# Patient Record
Sex: MALE | Race: WHITE | NOT HISPANIC OR LATINO | Employment: UNEMPLOYED | URBAN - METROPOLITAN AREA
[De-identification: names, ages, dates, MRNs, and addresses within clinical notes are randomized per-mention and may not be internally consistent; named-entity substitution may affect disease eponyms.]

---

## 2017-06-28 ENCOUNTER — HOSPITAL ENCOUNTER (EMERGENCY)
Facility: HOSPITAL | Age: 27
Discharge: HOME | End: 2017-06-29
Attending: EMERGENCY MEDICINE | Admitting: EMERGENCY MEDICINE

## 2017-06-28 ENCOUNTER — APPOINTMENT (EMERGENCY)
Dept: RADIOLOGY | Facility: HOSPITAL | Age: 27
End: 2017-06-28

## 2017-06-28 VITALS
SYSTOLIC BLOOD PRESSURE: 148 MMHG | WEIGHT: 185 LBS | HEIGHT: 68 IN | BODY MASS INDEX: 28.04 KG/M2 | OXYGEN SATURATION: 99 % | TEMPERATURE: 98.5 F | DIASTOLIC BLOOD PRESSURE: 84 MMHG | RESPIRATION RATE: 20 BRPM | HEART RATE: 95 BPM

## 2017-06-28 DIAGNOSIS — R07.89 CHEST WALL PAIN: Primary | ICD-10-CM

## 2017-06-28 LAB
ALBUMIN SERPL BCP-MCNC: 4.2 G/DL (ref 3.5–5)
ALP SERPL-CCNC: 64 U/L (ref 46–116)
ALT SERPL W P-5'-P-CCNC: 21 U/L (ref 12–78)
ANION GAP SERPL CALCULATED.3IONS-SCNC: 9 MMOL/L (ref 4–13)
AST SERPL W P-5'-P-CCNC: 8 U/L (ref 5–45)
BASOPHILS # BLD AUTO: 0.05 THOUSANDS/ΜL (ref 0–0.1)
BASOPHILS NFR BLD AUTO: 1 % (ref 0–1)
BILIRUB DIRECT SERPL-MCNC: 0.09 MG/DL (ref 0–0.2)
BILIRUB SERPL-MCNC: 0.3 MG/DL (ref 0.2–1)
BUN SERPL-MCNC: 16 MG/DL (ref 5–25)
CALCIUM SERPL-MCNC: 9.4 MG/DL (ref 8.3–10.1)
CHLORIDE SERPL-SCNC: 105 MMOL/L (ref 100–108)
CO2 SERPL-SCNC: 31 MMOL/L (ref 21–32)
CREAT SERPL-MCNC: 1.19 MG/DL (ref 0.6–1.3)
EOSINOPHIL # BLD AUTO: 0.36 THOUSAND/ΜL (ref 0–0.61)
EOSINOPHIL NFR BLD AUTO: 5 % (ref 0–6)
ERYTHROCYTE [DISTWIDTH] IN BLOOD BY AUTOMATED COUNT: 12.4 % (ref 11.6–15.1)
GFR SERPL CREATININE-BSD FRML MDRD: >60 ML/MIN/1.73SQ M
GLUCOSE SERPL-MCNC: 106 MG/DL (ref 65–140)
HCT VFR BLD AUTO: 47.4 % (ref 36.5–49.3)
HGB BLD-MCNC: 16.5 G/DL (ref 12–17)
LIPASE SERPL-CCNC: 83 U/L (ref 73–393)
LYMPHOCYTES # BLD AUTO: 2.73 THOUSANDS/ΜL (ref 0.6–4.47)
LYMPHOCYTES NFR BLD AUTO: 34 % (ref 14–44)
MCH RBC QN AUTO: 29.9 PG (ref 26.8–34.3)
MCHC RBC AUTO-ENTMCNC: 34.8 G/DL (ref 31.4–37.4)
MCV RBC AUTO: 86 FL (ref 82–98)
MONOCYTES # BLD AUTO: 0.65 THOUSAND/ΜL (ref 0.17–1.22)
MONOCYTES NFR BLD AUTO: 8 % (ref 4–12)
NEUTROPHILS # BLD AUTO: 4.27 THOUSANDS/ΜL (ref 1.85–7.62)
NEUTS SEG NFR BLD AUTO: 53 % (ref 43–75)
NRBC BLD AUTO-RTO: 0 /100 WBCS
PLATELET # BLD AUTO: 199 THOUSANDS/UL (ref 149–390)
PMV BLD AUTO: 11 FL (ref 8.9–12.7)
POTASSIUM SERPL-SCNC: 4.5 MMOL/L (ref 3.5–5.3)
PROT SERPL-MCNC: 7.5 G/DL (ref 6.4–8.2)
RBC # BLD AUTO: 5.51 MILLION/UL (ref 3.88–5.62)
SODIUM SERPL-SCNC: 145 MMOL/L (ref 136–145)
WBC # BLD AUTO: 8.08 THOUSAND/UL (ref 4.31–10.16)

## 2017-06-28 PROCEDURE — 96374 THER/PROPH/DIAG INJ IV PUSH: CPT

## 2017-06-28 PROCEDURE — 96361 HYDRATE IV INFUSION ADD-ON: CPT

## 2017-06-28 PROCEDURE — 71020 HB CHEST X-RAY 2VW FRONTAL&LATL: CPT

## 2017-06-28 PROCEDURE — 80076 HEPATIC FUNCTION PANEL: CPT | Performed by: EMERGENCY MEDICINE

## 2017-06-28 PROCEDURE — 83690 ASSAY OF LIPASE: CPT | Performed by: EMERGENCY MEDICINE

## 2017-06-28 PROCEDURE — 80048 BASIC METABOLIC PNL TOTAL CA: CPT | Performed by: EMERGENCY MEDICINE

## 2017-06-28 PROCEDURE — 93005 ELECTROCARDIOGRAM TRACING: CPT | Performed by: EMERGENCY MEDICINE

## 2017-06-28 PROCEDURE — 85025 COMPLETE CBC W/AUTO DIFF WBC: CPT | Performed by: EMERGENCY MEDICINE

## 2017-06-28 PROCEDURE — 36415 COLL VENOUS BLD VENIPUNCTURE: CPT | Performed by: EMERGENCY MEDICINE

## 2017-06-28 RX ORDER — ONDANSETRON 2 MG/ML
4 INJECTION INTRAMUSCULAR; INTRAVENOUS ONCE
Status: COMPLETED | OUTPATIENT
Start: 2017-06-28 | End: 2017-06-28

## 2017-06-28 RX ADMIN — ONDANSETRON 4 MG: 2 INJECTION INTRAMUSCULAR; INTRAVENOUS at 23:21

## 2017-06-28 RX ADMIN — SODIUM CHLORIDE 1000 ML: 0.9 INJECTION, SOLUTION INTRAVENOUS at 23:25

## 2017-06-29 LAB
ATRIAL RATE: 90 BPM
P AXIS: 40 DEGREES
PR INTERVAL: 116 MS
QRS AXIS: 35 DEGREES
QRSD INTERVAL: 90 MS
QT INTERVAL: 350 MS
QTC INTERVAL: 428 MS
T WAVE AXIS: 52 DEGREES
VENTRICULAR RATE: 90 BPM

## 2017-06-29 PROCEDURE — 99285 EMERGENCY DEPT VISIT HI MDM: CPT

## 2017-11-11 ENCOUNTER — HOSPITAL ENCOUNTER (EMERGENCY)
Facility: HOSPITAL | Age: 27
Discharge: HOME | End: 2017-11-11
Attending: EMERGENCY MEDICINE | Admitting: EMERGENCY MEDICINE

## 2017-11-11 ENCOUNTER — APPOINTMENT (EMERGENCY)
Dept: CT IMAGING | Facility: HOSPITAL | Age: 27
End: 2017-11-11

## 2017-11-11 VITALS
OXYGEN SATURATION: 98 % | WEIGHT: 173.72 LBS | BODY MASS INDEX: 26.41 KG/M2 | HEART RATE: 85 BPM | SYSTOLIC BLOOD PRESSURE: 143 MMHG | RESPIRATION RATE: 18 BRPM | DIASTOLIC BLOOD PRESSURE: 87 MMHG | TEMPERATURE: 97.6 F

## 2017-11-11 DIAGNOSIS — J06.9 VIRAL URI: ICD-10-CM

## 2017-11-11 DIAGNOSIS — G51.0 BELL'S PALSY: Primary | ICD-10-CM

## 2017-11-11 LAB
ANION GAP BLD CALC-SCNC: 18 MMOL/L (ref 4–13)
BUN BLD-MCNC: 20 MG/DL (ref 5–25)
CA-I BLD-SCNC: 1.12 MMOL/L (ref 1.12–1.32)
CHLORIDE BLD-SCNC: 101 MMOL/L (ref 100–108)
CREAT BLD-MCNC: 0.7 MG/DL (ref 0.6–1.3)
GFR SERPL CREATININE-BSD FRML MDRD: 129 ML/MIN/1.73SQ M
GLUCOSE SERPL-MCNC: 84 MG/DL (ref 65–140)
HCT VFR BLD CALC: 45 % (ref 36.5–49.3)
HGB BLDA-MCNC: 15.3 G/DL (ref 12–17)
PCO2 BLD: 26 MMOL/L (ref 21–32)
POTASSIUM BLD-SCNC: 3.8 MMOL/L (ref 3.5–5.3)
SODIUM BLD-SCNC: 140 MMOL/L (ref 136–145)
SPECIMEN SOURCE: ABNORMAL

## 2017-11-11 PROCEDURE — 70487 CT MAXILLOFACIAL W/DYE: CPT

## 2017-11-11 PROCEDURE — 99284 EMERGENCY DEPT VISIT MOD MDM: CPT

## 2017-11-11 PROCEDURE — 96374 THER/PROPH/DIAG INJ IV PUSH: CPT

## 2017-11-11 PROCEDURE — 80047 BASIC METABLC PNL IONIZED CA: CPT

## 2017-11-11 PROCEDURE — 36415 COLL VENOUS BLD VENIPUNCTURE: CPT | Performed by: EMERGENCY MEDICINE

## 2017-11-11 PROCEDURE — 86618 LYME DISEASE ANTIBODY: CPT | Performed by: EMERGENCY MEDICINE

## 2017-11-11 PROCEDURE — 85014 HEMATOCRIT: CPT

## 2017-11-11 RX ORDER — PREDNISONE 10 MG/1
TABLET ORAL
Qty: 63 TABLET | Refills: 0 | Status: SHIPPED | OUTPATIENT
Start: 2017-11-11 | End: 2018-10-13 | Stop reason: ALTCHOICE

## 2017-11-11 RX ORDER — KETOROLAC TROMETHAMINE 30 MG/ML
15 INJECTION, SOLUTION INTRAMUSCULAR; INTRAVENOUS ONCE
Status: COMPLETED | OUTPATIENT
Start: 2017-11-11 | End: 2017-11-11

## 2017-11-11 RX ORDER — ACYCLOVIR 400 MG/1
400 TABLET ORAL 4 TIMES DAILY
Qty: 40 TABLET | Refills: 0 | Status: SHIPPED | OUTPATIENT
Start: 2017-11-11 | End: 2017-11-21

## 2017-11-11 RX ADMIN — IOHEXOL 85 ML: 350 INJECTION, SOLUTION INTRAVENOUS at 13:24

## 2017-11-11 RX ADMIN — KETOROLAC TROMETHAMINE 15 MG: 30 INJECTION, SOLUTION INTRAMUSCULAR at 13:32

## 2017-11-11 NOTE — ED PROVIDER NOTES
History  Chief Complaint   Patient presents with    Facial Numbness     Pt states that he has had right sided facial numbness since Sunday  HPI    None       No past medical history on file  Past Surgical History:   Procedure Laterality Date    TUMOR REMOVAL      parathyroid gland       No family history on file  I have reviewed and agree with the history as documented  Social History   Substance Use Topics    Smoking status: Current Every Day Smoker     Packs/day: 1 00     Types: Cigarettes    Smokeless tobacco: Not on file    Alcohol use Yes      Comment: occ        Review of Systems    Physical Exam  ED Triage Vitals [11/11/17 1117]   Temperature Pulse Respirations Blood Pressure SpO2   97 6 °F (36 4 °C) 68 18 147/82 100 %      Temp Source Heart Rate Source Patient Position - Orthostatic VS BP Location FiO2 (%)   Oral Monitor Lying Right arm --      Pain Score       --           Orthostatic Vital Signs  Vitals:    11/11/17 1117   BP: 147/82   Pulse: 68   Patient Position - Orthostatic VS: Lying       Physical Exam   Constitutional: He is oriented to person, place, and time  He appears well-developed and well-nourished  No distress  HENT:   Head: Normocephalic and atraumatic  Right Ear: Tympanic membrane, external ear and ear canal normal    Left Ear: Tympanic membrane, external ear and ear canal normal    Nose: Mucosal edema and rhinorrhea present  Right sinus exhibits no frontal sinus tenderness  Left sinus exhibits no frontal sinus tenderness  Mouth/Throat: Oropharynx is clear and moist  No oral lesions  No trismus in the jaw  No dental caries  Tonsils are 0 on the right  Tonsils are 0 on the left  No tonsillar exudate  No facial or parotid swelling   Eyes: Conjunctivae and EOM are normal  Pupils are equal, round, and reactive to light  Neck: Normal range of motion  No tracheal deviation present     Cardiovascular: Normal rate, regular rhythm, normal heart sounds and intact distal pulses  Pulmonary/Chest: Effort normal and breath sounds normal  No respiratory distress  Abdominal: Soft  He exhibits no distension  There is no tenderness  Lymphadenopathy:     He has no cervical adenopathy  Neurological: He is alert and oriented to person, place, and time  He has normal strength  A cranial nerve deficit is present  No sensory deficit  GCS eye subscore is 4  GCS verbal subscore is 5  GCS motor subscore is 6  Reflex Scores:       Bicep reflexes are 1+ on the right side and 1+ on the left side  Brachioradialis reflexes are 1+ on the right side and 1+ on the left side  Patellar reflexes are 1+ on the right side and 1+ on the left side  Right seventh nerve palsy   Skin: Skin is warm and dry  Psychiatric: He has a normal mood and affect  His behavior is normal    Nursing note and vitals reviewed  ED Medications  Medications - No data to display    Diagnostic Studies  Results Reviewed     Procedure Component Value Units Date/Time    Lyme Antibody Profile with reflex to [facility] [[id number]]     Lab Status:  No result Specimen:  Blood                  CT facial bones with contrast    (Results Pending)              Procedures  Procedures       Phone Contacts  ED Phone Contact    ED Course  ED Course                                MDM  Number of Diagnoses or Management Options  Bell's palsy: new and requires workup  Viral URI: new and requires workup  Diagnosis management comments: This is a 59-year-old male who presents here today with right-sided facial weakness and numbness since 11/05  He states it started suddenly while he is at work  It has not progressed or changed  He had development of URI symptoms that same day, with nasal congestion, sinus pressure, cough, and generalized malaise and weakness, but with no other focal weakness  He has no other numbness  He has no gait instability    He states sometimes when he first wakes up in the morning his right eye will feel a little bit blurry, but denies any other vision changes  He denies any hearing changes or tinnitus  He denies any fevers or rash  He denies any injuries to his face or head  He does state earlier this week the right side of his face seemed to swell, but this has improved  He denies any rash  He has no prior similar symptoms  He denies any underlying medical problems  ROS: Otherwise negative, unless stated as above  He is well-appearing, in no acute distress  He does have a right facial palsy  There is no current parotid or facial swelling  He has no cervical lymphadenopathy  He does have nasal congestion  He has full extraocular movements without pain  He has no other focal neurologic deficits  The remainder of his exam is unremarkable  This is consistent with Bell's palsy  I am not concerned about underlying CVA  This could be viral versus Lyme versus parotitis or underlying cavitary sinus lesion given his sinus congestion  We will get a CT scan of his facial bones with contrast to evaluate for these latter two lesions, obtain Lyme titers as he has no other symptoms for this, otherwise will treat him with steroids and antivirals  The CT scan shows no acute abnormalities  I discussed with the patient treatment at home, follow-up, and indications for return, and he expresses understanding with this plan  Amount and/or Complexity of Data Reviewed  Clinical lab tests: reviewed and ordered  Tests in the radiology section of CPT®: ordered and reviewed  Independent visualization of images, tracings, or specimens: yes      CritCare Time    Disposition  Final diagnoses:   None     ED Disposition     None      Follow-up Information    None       Patient's Medications    No medications on file     No discharge procedures on file      ED Provider  Electronically Signed by           Moon Varghese MD  11/11/17 0132

## 2017-11-11 NOTE — DISCHARGE INSTRUCTIONS
Take the medications as prescribed  Tape your eye shut at night while sleeping  Use a lubricating eyedrop to your eye as needed to help with the dryness or irritation  Follow up with the primary care doctor in the neurologist for further evaluation  Return for worsening or changing symptoms, or for any other concerns  Sanchez Palsy   WHAT YOU NEED TO KNOW:   Bell palsy is a sudden weakness or paralysis of one side of your face  Bell palsy occurs when the nerve that controls the muscles in your face becomes swollen or irritated  DISCHARGE INSTRUCTIONS:   Medicines:   · Medicine may be given  to decrease swelling and irritation of your facial nerve  You may receive antiviral medicine if your healthcare provider thinks a virus caused your Bell palsy  Your healthcare provider may also suggest acetaminophen or ibuprofen to reduce pain  These medicines are available without a doctor's order  Ask which medicine to take, and how much you need  Follow directions  Acetaminophen can cause liver damage, and ibuprofen can cause stomach bleeding or kidney damage  · Take your medicine as directed  Contact your healthcare provider if you think your medicine is not helping or if you have side effects  Tell him if you are allergic to any medicine  Keep a list of the medicines, vitamins, and herbs you take  Include the amounts, and when and why you take them  Bring the list or the pill bottles to follow-up visits  Carry your medicine list with you in case of an emergency  Follow up with your healthcare provider as directed:  Write down your questions so you remember to ask them during your visits  Eye care: Your healthcare provider may recommend that you use artificial tears during the day to keep your eye moist  You may need to use an eye ointment at night  You may also need to wear a patch over your eye and tape it shut while you sleep  This helps keep your eye from getting dry and infected   Wear sunglasses to protect your eye from direct sunlight  Stay away from places that have fumes, dust, or other particles in the air that may harm your eye  Physical therapy:  A physical therapist may teach you how to massage your face and exercise the nerves and muscles in your face  This may help you get better sooner and prevent long-term problems  You can exercise on your own when your facial movement begins to return  Open and close your eye, wink, and smile wide  Do the exercises for 15 or 20 minutes several times a day  Contact your healthcare provider if:   · You have a fever  · Your eye becomes red, irritated, or painful  · You have questions or concerns about your condition or care  Return to the emergency department if:   · You develop weakness or numbness on one side of your body (other than your face)  · You have double vision, or you lose vision in your eye  · You have trouble thinking clearly  © 2017 [address] Information is for End User's use only and may not be sold, redistributed or otherwise used for commercial purposes  All illustrations and images included in CareNotes® are the copyrighted property of [employer]  or [employer]  The above information is an  only  It is not intended as medical advice for individual conditions or treatments  Talk to your doctor, nurse or pharmacist before following any medical regimen to see if it is safe and effective for you  Upper Respiratory Infection, Ambulatory Care   GENERAL INFORMATION:   An upper respiratory infection  is also called a common cold  It can affect your nose, throat, ears, and sinuses     Common symptoms include the following:   · Runny or stuffy nose    · Sneezing and coughing    · Sore throat or hoarseness    · Red, watery, and sore eyes    · Tiredness or restlessness    · Chills and fever    · Headache, body aches, or sore muscles  Seek immediate care for the following symptoms: · Headaches or a stiff neck    · Bright lights hurt your eyes    · Chest pain or trouble breathing  Treatment for an upper respiratory infection  may include any of the following:  · Decongestants  help decrease nasal congestion and improve your breathing  Do not use decongestant sprays for more than a few days  · Cough suppressants  help decrease coughing  Ask your healthcare provider which type of cough medicine is best for you  Some cough medicines need a doctor's order  · NSAIDs  help decrease swelling and pain or fever  This medicine is available with or without a doctor's order  NSAIDs can cause stomach bleeding or kidney problems in certain people  If you take blood thinner medicine, always ask your healthcare provider if NSAIDs are safe for you  Always read the medicine label and follow directions  Care for an upper respiratory infection:   · Rest  until your fever is gone or you feel better  · Drink liquids as directed to prevent dehydration  You may need to drink 8 to 10 cups of liquid each day  Good liquids to drink include water, ginger ale, tea, or fruit juices  · Gargle  with warm salt water to help your sore throat feel better  Mix ¼ teaspoon salt with 1 cup warm water  You may also suck on hard candy or throat lozenges  · Saline nasal drops  help loosen your nasal congestion  They can be bought without a doctor's order  · Take a warm bath or shower  to help decrease body aches and help you breathe easier  · Use a cool-mist humidifier  to increase air moisture and make it easier for you to breathe  Prevent the spread of germs:   · Avoid others for the first 2 to 3 days of your cold  Germs are easily spread during this time  · Do not share food, drinks,  towels, or personal items with others  · Wash your hands often  Use soap and water  Wash your hands after you use the bathroom, change a child's diapers, or sneeze  Wash your hands before you prepare or eat food  Cover your mouth and nose with a tissue when you sneeze or cough  Follow up with your healthcare provider as directed:  Write down your questions so you remember to ask them during your visits  CARE AGREEMENT:   You have the right to help plan your care  Learn about your health condition and how it may be treated  Discuss treatment options with your caregivers to decide what care you want to receive  You always have the right to refuse treatment  The above information is an  only  It is not intended as medical advice for individual conditions or treatments  Talk to your doctor, nurse or pharmacist before following any medical regimen to see if it is safe and effective for you  © 2014 [address] is for End User's use only and may not be sold, redistributed or otherwise used for commercial purposes  All illustrations and images included in CareNotes® are the copyrighted property of [employer]  or [employer]

## 2017-11-13 LAB
B BURGDOR IGG SER IA-ACNC: 0.38
B BURGDOR IGM SER IA-ACNC: 0.27

## 2018-09-08 ENCOUNTER — HOSPITAL ENCOUNTER (EMERGENCY)
Facility: HOSPITAL | Age: 28
Discharge: HOME | End: 2018-09-08
Admitting: EMERGENCY MEDICINE
Payer: COMMERCIAL

## 2018-09-08 VITALS
TEMPERATURE: 98 F | RESPIRATION RATE: 16 BRPM | HEIGHT: 68 IN | HEART RATE: 88 BPM | DIASTOLIC BLOOD PRESSURE: 80 MMHG | SYSTOLIC BLOOD PRESSURE: 150 MMHG | OXYGEN SATURATION: 100 % | WEIGHT: 185 LBS | BODY MASS INDEX: 28.04 KG/M2

## 2018-09-08 DIAGNOSIS — R22.0 LUMP OF SCALP: ICD-10-CM

## 2018-09-08 DIAGNOSIS — K08.89 DENTALGIA: Primary | ICD-10-CM

## 2018-09-08 PROCEDURE — 99283 EMERGENCY DEPT VISIT LOW MDM: CPT

## 2018-09-08 RX ORDER — CLINDAMYCIN HYDROCHLORIDE 150 MG/1
300 CAPSULE ORAL 4 TIMES DAILY
Qty: 56 CAPSULE | Refills: 0 | Status: SHIPPED | OUTPATIENT
Start: 2018-09-08 | End: 2018-09-15

## 2018-09-08 RX ORDER — CLINDAMYCIN HYDROCHLORIDE 150 MG/1
300 CAPSULE ORAL ONCE
Status: COMPLETED | OUTPATIENT
Start: 2018-09-08 | End: 2018-09-08

## 2018-09-08 RX ADMIN — CLINDAMYCIN HYDROCHLORIDE 300 MG: 150 CAPSULE ORAL at 13:29

## 2018-09-08 NOTE — DISCHARGE INSTRUCTIONS
Return to the Emergency Department sooner if increased pain, fever, vomiting, difficulty breathing or swallowing, drooling  Toothache   WHAT YOU NEED TO KNOW:   A toothache is pain that is caused by irritation of the nerves in the center of your tooth  The irritation may be caused by several problems, such as a cavity, an infection, a cracked tooth, or gum disease  It is very important to follow up with your dentist so the cause of your toothache can be diagnosed and treated  This can help prevent more serious problems  DISCHARGE INSTRUCTIONS:   Medicines: You may  need any of the following:  · NSAIDs  decrease swelling and pain  This medicine can be bought with or without a doctor's order  This medicine can cause stomach bleeding or kidney problems in certain people  If you take blood thinner medicine, always ask your healthcare provider if NSAIDs are safe for you  Always read the medicine label and follow the directions on it before using this medicine  · Acetaminophen  decreases pain  It is available without a doctor's order  Ask how much to take and how often to take it  Follow directions  Acetaminophen can cause liver damage if not taken correctly  · Pain medicine  may be given as a pill or as medicine that you put directly on your tooth or gums  Do not wait until the pain is severe before you take this medicine  · Antibiotics  help fight or prevent an infection caused by bacteria  Take them as directed  · Take your medicine as directed  Contact your healthcare provider if you think your medicine is not helping or if you have side effects  Tell him of her if you are allergic to any medicine  Keep a list of the medicines, vitamins, and herbs you take  Include the amounts, and when and why you take them  Bring the list or the pill bottles to follow-up visits  Carry your medicine list with you in case of an emergency  Follow up with your dentist as directed:   You may be referred to a dental surgeon  Write down your questions so you remember to ask them during your visits  Self-care:   · Rinse your mouth with warm salt water 4 times a day or as directed  · You may need to eat soft foods to help relieve pain caused by chewing  Contact your dentist if:   · You have questions or concerns about your condition or care  Return to the emergency department if:   · You have trouble breathing  · You have swelling in your face or neck  · You have a fever and chills  · You have trouble speaking or swallowing  · You have trouble opening or closing your mouth  © 2017 [address] [facility] Information is for End User's use only and may not be sold, redistributed or otherwise used for commercial purposes  All illustrations and images included in CareNotes® are the copyrighted property of [employer]  or [employer]  The above information is an  only  It is not intended as medical advice for individual conditions or treatments  Talk to your doctor, nurse or pharmacist before following any medical regimen to see if it is safe and effective for you

## 2018-09-08 NOTE — ED PROVIDER NOTES
History  Chief Complaint   Patient presents with    Mass     noticed a lump on the top of his head, painful    Dental Pain     left lower, infected tooth     40-year-old male here for evaluation of dental pain and a lump on his head  He notes a lump about 2-3 weeks ago  States it only bothers him if he tries to push on it  He has no fevers chills rash redness  No nausea vomiting  He denies any new soaps shampoos detergents  He has never had this before  No contacts with similar lump  He also notes dental pain around left lower 3rd molar  This is an acute on chronic issue for him  He has a previously fractured tooth here and thinks he is getting an infection of this tooth  No trismus  No difficulty swallowing  Tolerating p o  solids and liquids  History provided by:  Patient  Dental Pain   Location:  Lower  Lower teeth location:  17/LL 3rd molar  Quality:  Aching and throbbing  Severity:  Mild  Onset quality:  Gradual  Duration:  3 weeks  Timing:  Constant  Progression:  Unchanged  Chronicity:  New  Context: not abscess, cap still on, not crown fracture, not dental caries, not dental fracture, not enamel fracture, filling intact, not intrusion, not malocclusion, normal dentition, not recent dental surgery and not trauma    Previous work-up:  Dental exam  Relieved by:  Nothing  Worsened by:  Nothing  Ineffective treatments:  None tried  Associated symptoms: no congestion, no difficulty swallowing, no drooling, no facial pain, no facial swelling, no fever, no gum swelling, no headaches, no neck pain, no neck swelling, no oral bleeding, no oral lesions and no trismus    Risk factors: no alcohol problem, no cancer, no chewing tobacco use, no diabetes, no immunosuppression, sufficient dental care, no periodontal disease and no smoking        Prior to Admission Medications   Prescriptions Last Dose Informant Patient Reported?  Taking?   predniSONE 10 mg tablet   No No   Sig: Take 6 tabs x3 days, 5 tabs x3 days, 4 tabs x3 days, 3 tabs x3 days, 2 tabs x3 days, 1 tab x 3 days      Facility-Administered Medications: None       History reviewed  No pertinent past medical history  Past Surgical History:   Procedure Laterality Date    TUMOR REMOVAL      parathyroid gland       History reviewed  No pertinent family history  I have reviewed and agree with the history as documented  Social History   Substance Use Topics    Smoking status: Current Every Day Smoker     Packs/day: 1 00     Types: Cigarettes    Smokeless tobacco: Never Used    Alcohol use Yes      Comment: occ        Review of Systems   Constitutional: Negative for activity change, appetite change, chills, diaphoresis, fatigue, fever and unexpected weight change  HENT: Positive for dental problem  Negative for congestion, drooling, facial swelling, mouth sores, rhinorrhea, sinus pressure, sore throat and trouble swallowing  Eyes: Negative for photophobia and visual disturbance  Respiratory: Negative for apnea, cough, choking, chest tightness, shortness of breath, wheezing and stridor  Cardiovascular: Negative for chest pain, palpitations and leg swelling  Gastrointestinal: Negative for abdominal distention, abdominal pain, blood in stool, constipation, diarrhea, nausea and vomiting  Genitourinary: Negative for decreased urine volume, difficulty urinating, dysuria, enuresis, flank pain, frequency, hematuria and urgency  Musculoskeletal: Negative for arthralgias, myalgias, neck pain and neck stiffness  Skin: Positive for rash (lump)  Negative for color change, pallor and wound  Allergic/Immunologic: Negative  Neurological: Negative for dizziness, tremors, syncope, weakness, light-headedness, numbness and headaches  Hematological: Negative  Psychiatric/Behavioral: Negative  All other systems reviewed and are negative  Physical Exam  Physical Exam   Constitutional: He is oriented to person, place, and time   He appears well-developed and well-nourished  Non-toxic appearance  He does not have a sickly appearance  He does not appear ill  No distress  HENT:   Head: Normocephalic and atraumatic  Mouth/Throat: Uvula is midline, oropharynx is clear and moist and mucous membranes are normal  He does not have dentures  No trismus in the jaw  Abnormal dentition  Dental caries present  No dental abscesses  Eyes: EOM and lids are normal  Pupils are equal, round, and reactive to light  Neck: Normal range of motion  Neck supple  Cardiovascular: Normal rate, regular rhythm, S1 normal, S2 normal, normal heart sounds, intact distal pulses and normal pulses  Exam reveals no gallop, no distant heart sounds, no friction rub and no decreased pulses  No murmur heard  Pulses:       Radial pulses are 2+ on the right side, and 2+ on the left side  Pulmonary/Chest: Effort normal and breath sounds normal  No accessory muscle usage  No apnea, no tachypnea and no bradypnea  No respiratory distress  He has no decreased breath sounds  He has no wheezes  He has no rhonchi  He has no rales  Abdominal: Soft  Normal appearance  He exhibits no distension  There is no tenderness  There is no rigidity, no rebound and no guarding  Musculoskeletal: Normal range of motion  He exhibits no edema, tenderness or deformity  Neurological: He is alert and oriented to person, place, and time  No cranial nerve deficit  GCS eye subscore is 4  GCS verbal subscore is 5  GCS motor subscore is 6  GCS 15  AAOx3  Ambulating in department without difficulty  CN II-XII grossly intact  No focal neuro deficits  Skin: Skin is warm, dry and intact  No rash noted  He is not diaphoretic  No erythema  No pallor  Psychiatric: His speech is normal    Nursing note and vitals reviewed        Vital Signs  ED Triage Vitals [09/08/18 1309]   Temperature Pulse Respirations Blood Pressure SpO2   98 °F (36 7 °C) 88 16 150/80 100 %      Temp Source Heart Rate Source Patient Position - Orthostatic VS BP Location FiO2 (%)   Oral Monitor Sitting Right arm --      Pain Score       6           Vitals:    09/08/18 1309   BP: 150/80   Pulse: 88   Patient Position - Orthostatic VS: Sitting       Visual Acuity      ED Medications  Medications   clindamycin (CLEOCIN) capsule 300 mg (300 mg Oral Given 9/8/18 1329)       Diagnostic Studies  Results Reviewed     None                 No orders to display              Procedures  Procedures       Phone Contacts  ED Phone Contact    ED Course                               MDM  Number of Diagnoses or Management Options  Mahad Melara: new and requires workup  Lump of scalp: new and requires workup  Diagnosis management comments: DDx including but not limited to: dental katie, dental infection, dental abscess, dry socket, gingivitis; doubt tri's angina; rash ddx includes but is not limited to folliculitis, benign nevus, doubt cellulitis/infectious process  Risk of Complications, Morbidity, and/or Mortality  Presenting problems: low  Management options: low  General comments: 59-year-old male with dental pain and nonspecific lump on the scalp  The etiology of the lump is unclear  Could be folliculitis, could be a benign nevus  Recommended he follow up with his PCP  In regards to the dental pain, this could be an early dental infection  Will start with clindamycin and have him follow up with his dentist   He states he just established himself with a dentist now that he has insurance  We discussed follow-up and return parameters  Patient understands and agrees with this plan      Patient Progress  Patient progress: stable    CritCare Time    Disposition  Final diagnoses:   Willian Mould of scalp     Time reflects when diagnosis was documented in both MDM as applicable and the Disposition within this note     Time User Action Codes Description Comment    9/8/2018  1:22 PM Bill Andersen Add [K08 89] Mahad Melara     9/8/2018 1:22 PM Jose JUDGE Add [R22 0] Lump of scalp       ED Disposition     ED Disposition Condition Comment    Discharge  Gin Calle discharge to home/self care  Condition at discharge: Good        Follow-up Information     Follow up With Specialties Details Why [address], [facility]   [address]  [facility] [phone] [id number]      [address] Kaitlynn Martines Jr [facility]  Call if unable to see your dentist Address: [address] [place] [address], [address] [place] [address]  [phone]          Discharge Medication List as of 9/8/2018  1:25 PM      START taking these medications    Details   clindamycin (CLEOCIN) 150 mg capsule Take 2 capsules (300 mg total) by mouth 4 (four) times a day for 7 days, Starting Sat 9/8/2018, Until Sat 9/15/2018, Print         CONTINUE these medications which have NOT CHANGED    Details   predniSONE 10 mg tablet Take 6 tabs x3 days, 5 tabs x3 days, 4 tabs x3 days, 3 tabs x3 days, 2 tabs x3 days, 1 tab x 3 days, Print           No discharge procedures on file      ED Provider  Electronically Signed by           Tony Harris PA-C  09/08/18 8628

## 2018-10-13 ENCOUNTER — HOSPITAL ENCOUNTER (EMERGENCY)
Facility: HOSPITAL | Age: 28
Discharge: HOME | End: 2018-10-13
Attending: EMERGENCY MEDICINE | Admitting: EMERGENCY MEDICINE
Payer: COMMERCIAL

## 2018-10-13 VITALS
SYSTOLIC BLOOD PRESSURE: 168 MMHG | OXYGEN SATURATION: 97 % | RESPIRATION RATE: 16 BRPM | BODY MASS INDEX: 26.46 KG/M2 | HEART RATE: 106 BPM | WEIGHT: 174 LBS | DIASTOLIC BLOOD PRESSURE: 94 MMHG | TEMPERATURE: 98 F

## 2018-10-13 DIAGNOSIS — H60.90 OTITIS EXTERNA: Primary | ICD-10-CM

## 2018-10-13 PROCEDURE — 99283 EMERGENCY DEPT VISIT LOW MDM: CPT

## 2018-10-13 RX ORDER — NEOMYCIN SULFATE, POLYMYXIN B SULFATE AND HYDROCORTISONE 10; 3.5; 1 MG/ML; MG/ML; [USP'U]/ML
4 SUSPENSION/ DROPS AURICULAR (OTIC) EVERY 6 HOURS SCHEDULED
Qty: 10 ML | Refills: 0 | Status: SHIPPED | OUTPATIENT
Start: 2018-10-13 | End: 2018-10-20

## 2018-10-13 RX ORDER — AMOXICILLIN AND CLAVULANATE POTASSIUM 875; 125 MG/1; MG/1
1 TABLET, FILM COATED ORAL 2 TIMES DAILY
Qty: 14 TABLET | Refills: 0 | Status: SHIPPED | OUTPATIENT
Start: 2018-10-13 | End: 2018-10-20

## 2018-10-13 NOTE — ED PROVIDER NOTES
History  Chief Complaint   Patient presents with    Earache     x 3 weeks, right ear, states 10 days ago it started to bleed and pus came out    Mass     on top of head for "awhile" approx 2 months     66-year-old male patient here for right earache and a lump on his head  He notes for the past 3 weeks his ears but hurting, suddenly over the past couple days he has had discharge from the ear, occasional bleeding  Sounds muffled from this year, no complete loss of hearing  No pain or swelling mastoid area  He has no fevers no chills no headache  No neck pain or stiffness  Denies use of Q-tips  No trauma or injury to cause this  No prior history of similar symptoms  Also complaining of a lump on the top of his head  He has had this for about 3 months, occasionally painful however states it really only bothersome if he is pressing on it  History provided by:  Patient   used: No    Earache   Location:  Right  Behind ear:  No abnormality  Quality:  Aching  Severity:  Mild  Onset quality:  Gradual  Duration:  3 weeks  Timing:  Intermittent  Progression:  Worsening  Chronicity:  New  Context: not direct blow, not elevation change, not foreign body in ear, not loud noise, not recent URI and not water in ear    Relieved by:  Nothing  Worsened by:  Nothing  Ineffective treatments:  None tried  Associated symptoms: no abdominal pain, no congestion, no cough, no diarrhea, no ear discharge, no fever, no headaches, no hearing loss, no neck pain, no rash, no rhinorrhea, no sore throat, no tinnitus and no vomiting    Risk factors: no recent travel, no chronic ear infection and no prior ear surgery        None       History reviewed  No pertinent past medical history  Past Surgical History:   Procedure Laterality Date    TUMOR REMOVAL      parathyroid gland       History reviewed  No pertinent family history  I have reviewed and agree with the history as documented      Social History Substance Use Topics    Smoking status: Current Every Day Smoker     Packs/day: 1 00     Types: Cigarettes    Smokeless tobacco: Never Used    Alcohol use Yes      Comment: occ        Review of Systems   Constitutional: Negative for activity change, appetite change, chills, diaphoresis, fatigue, fever and unexpected weight change  HENT: Positive for ear pain  Negative for congestion, ear discharge, hearing loss, rhinorrhea, sinus pressure, sore throat, tinnitus and trouble swallowing  Eyes: Negative for photophobia and visual disturbance  Respiratory: Negative for apnea, cough, choking, chest tightness, shortness of breath, wheezing and stridor  Cardiovascular: Negative for chest pain, palpitations and leg swelling  Gastrointestinal: Negative for abdominal distention, abdominal pain, blood in stool, constipation, diarrhea, nausea and vomiting  Genitourinary: Negative for decreased urine volume, difficulty urinating, dysuria, enuresis, flank pain, frequency, hematuria and urgency  Musculoskeletal: Negative for arthralgias, myalgias, neck pain and neck stiffness  Skin: Negative for color change, pallor, rash and wound  Allergic/Immunologic: Negative  Neurological: Negative for dizziness, tremors, syncope, weakness, light-headedness, numbness and headaches  Hematological: Negative  Psychiatric/Behavioral: Negative  All other systems reviewed and are negative  Physical Exam  Physical Exam   Constitutional: He is oriented to person, place, and time  He appears well-developed and well-nourished  Non-toxic appearance  He does not have a sickly appearance  He does not appear ill  No distress  HENT:   Head: Normocephalic and atraumatic  Right Ear: Hearing normal  No lacerations  There is tenderness  No drainage or swelling  No foreign bodies  No mastoid tenderness  Tympanic membrane is injected, erythematous and bulging   Tympanic membrane is not scarred, not perforated and not retracted  No middle ear effusion  No hemotympanum  No decreased hearing is noted  Left Ear: Hearing, tympanic membrane, external ear and ear canal normal    Pain with manipulation of the external ear  Swelling of external auditory canal   No discharge  No bleeding  No foreign bodies  Eyes: Pupils are equal, round, and reactive to light  EOM and lids are normal    Neck: Normal range of motion  Neck supple  Cardiovascular: Normal rate, regular rhythm, S1 normal, S2 normal, normal heart sounds, intact distal pulses and normal pulses  Exam reveals no gallop, no distant heart sounds, no friction rub and no decreased pulses  No murmur heard  Pulses:       Radial pulses are 2+ on the right side, and 2+ on the left side  Pulmonary/Chest: Effort normal and breath sounds normal  No accessory muscle usage  No apnea, no tachypnea and no bradypnea  No respiratory distress  He has no decreased breath sounds  He has no wheezes  He has no rhonchi  He has no rales  Abdominal: Soft  Normal appearance  He exhibits no distension  There is no tenderness  There is no rigidity, no rebound and no guarding  Musculoskeletal: Normal range of motion  He exhibits no edema, tenderness or deformity  Neurological: He is alert and oriented to person, place, and time  No cranial nerve deficit  GCS eye subscore is 4  GCS verbal subscore is 5  GCS motor subscore is 6  GCS 15  AAOx3  Ambulating in department without difficulty  CN II-XII grossly intact  No focal neuro deficits  Skin: Skin is warm, dry and intact  No rash noted  He is not diaphoretic  No erythema  No pallor  Psychiatric: His speech is normal    Nursing note and vitals reviewed        Vital Signs  ED Triage Vitals [10/13/18 1833]   Temperature Pulse Respirations Blood Pressure SpO2   98 °F (36 7 °C) (!) 106 16 168/94 97 %      Temp Source Heart Rate Source Patient Position - Orthostatic VS BP Location FiO2 (%)   Oral Monitor Sitting Right arm --      Pain Score       8           Vitals:    10/13/18 1833   BP: 168/94   Pulse: (!) 106   Patient Position - Orthostatic VS: Sitting       Visual Acuity      ED Medications  Medications - No data to display    Diagnostic Studies  Results Reviewed     None                 No orders to display              Procedures  Procedures       Phone Contacts  ED Phone Contact    ED Course                               MDM  Number of Diagnoses or Management Options  Otitis externa: new and requires workup  Diagnosis management comments: DDx including but not limited to: Otitis media, otitis externa, bullous myringitis, perforated TM, impacted cerumen, cellulitis  Risk of Complications, Morbidity, and/or Mortality  Presenting problems: low  Management options: low  General comments: Plan/medical decision making:  Clinically appears to have it otitis externa  Will start on drops  Additionally he has a bulging tympanic membrane raising concern for superimposed otitis media  Will start on oral antibiotics as well  In regards to the lump on his head, this is nonspecific, no clear etiology  Does not appear cellulitic  Recommended he follow up with PCP and dermatologist if no improvement  Recommended he avoid palpating this area as that seems to be the cause of his pain  He understands and agrees with current plan  Patient Progress  Patient progress: stable    CritCare Time    Disposition  Final diagnoses:   Otitis externa     Time reflects when diagnosis was documented in both MDM as applicable and the Disposition within this note     Time User Action Codes Description Comment    10/13/2018  7:04 PM Janice Zarate Add [T80 90] Otitis externa       ED Disposition     ED Disposition Condition Comment    Discharge  Chick Patient discharge to home/self care      Condition at discharge: Good        Follow-up Information     Follow up With Specialties Details Why Crystal Lopez MD Otolaryngology Call ask for [place] office for local ENT; ask for follow up this coming week (3-5 days) [address]  [address] [facility] [address] [facility]            Discharge Medication List as of 10/13/2018  7:11 PM      START taking these medications    Details   amoxicillin-clavulanate (AUGMENTIN) 875-125 mg per tablet Take 1 tablet by mouth 2 (two) times a day for 7 days, Starting Sat 10/13/2018, Until Sat 10/20/2018, Print      neomycin-polymyxin-hydrocortisone (CORTISPORIN) 0 35%-10,000 units/mL-1% otic suspension Administer 4 drops into ears every 6 (six) hours for 7 days, Starting Sat 10/13/2018, Until Sat 10/20/2018, Print           No discharge procedures on file      ED Provider  Electronically Signed by           Blank Gil PA-C  10/13/18 2010

## 2018-10-13 NOTE — DISCHARGE INSTRUCTIONS
Return sooner to the Emergency Department if persistent fever, vomiting, diarrhea, difficulty breathing or urinating, neck stiffness, lethargy, rash  Otitis Externa   WHAT YOU NEED TO KNOW:   Otitis externa, or swimmer's ear, is an infection in the outer ear canal  This canal goes from the outside of the ear to the eardrum  DISCHARGE INSTRUCTIONS:   Seek care immediately if:   · You have severe ear pain  · You are suddenly unable to hear at all  · You have new swelling in your face, behind your ears, or in your neck  · You suddenly cannot move part of your face  · Your face suddenly feels numb  Contact your healthcare provider if:   · You have a fever  · Your signs and symptoms do not get better after 2 days of treatment  · Your signs and symptoms go away for a time, but then come back  · You have questions or concerns about your condition or care  Medicines:   · NSAIDs , such as ibuprofen, help decrease swelling, pain, and fever  This medicine is available with or without a doctor's order  NSAIDs can cause stomach bleeding or kidney problems in certain people  If you take blood thinner medicine, always ask if NSAIDs are safe for you  Always read the medicine label and follow directions  Do not give these medicines to children under 10months of age without direction from your child's healthcare provider  · Acetaminophen  decreases pain and fever  It is available without a doctor's order  Ask how much to take and how often to take it  Follow directions  Acetaminophen can cause liver damage if not taken correctly  · Ear drops  that contain an antibiotic may be given  The antibiotic helps treat a bacterial infection  You may also be given steroid medicine  The steroid helps decrease redness, swelling, and pain  · Take your medicine as directed  Contact your healthcare provider if you think your medicine is not helping or if you have side effects   Tell him or her if you are allergic to any medicine  Keep a list of the medicines, vitamins, and herbs you take  Include the amounts, and when and why you take them  Bring the list or the pill bottles to follow-up visits  Carry your medicine list with you in case of an emergency  Follow up with your healthcare provider as directed:  Write down your questions so you remember to ask them during your visits  How to use eardrops:   · Lie down on your side with your infected ear facing up  · Carefully drip the correct number of eardrops into your ear  Have another person help you if possible  · Gently move the outside part of your ear back and forth to help the medicine reach your ear canal      · Stay lying down in the same position (with your ear facing up) for 3 to 5 minutes  Prevent otitis externa:   · Do not put cotton swabs or foreign objects in your ears  · Wrap a clean moist washcloth around your finger, and use it to clean your outer ear and remove extra ear wax  · Use ear plugs when you swim  Dry your outer ears completely after you swim or bathe  © 2017 [address] [employer] Information is for End User's use only and may not be sold, redistributed or otherwise used for commercial purposes  All illustrations and images included in CareNotes® are the copyrighted property of A D A M , Inc  or [employer]  The above information is an  only  It is not intended as medical advice for individual conditions or treatments  Talk to your doctor, nurse or pharmacist before following any medical regimen to see if it is safe and effective for you

## 2019-04-29 ENCOUNTER — HOSPITAL ENCOUNTER (EMERGENCY)
Facility: HOSPITAL | Age: 29
Discharge: HOME | End: 2019-04-29
Attending: EMERGENCY MEDICINE | Admitting: EMERGENCY MEDICINE

## 2019-04-29 VITALS
SYSTOLIC BLOOD PRESSURE: 127 MMHG | BODY MASS INDEX: 24.56 KG/M2 | OXYGEN SATURATION: 97 % | WEIGHT: 165.79 LBS | RESPIRATION RATE: 18 BRPM | HEART RATE: 104 BPM | DIASTOLIC BLOOD PRESSURE: 64 MMHG | HEIGHT: 69 IN | TEMPERATURE: 100.8 F

## 2019-04-29 DIAGNOSIS — L03.011 CELLULITIS OF RIGHT RING FINGER: ICD-10-CM

## 2019-04-29 DIAGNOSIS — L03.011 PARONYCHIA OF RIGHT RING FINGER: Primary | ICD-10-CM

## 2019-04-29 PROCEDURE — 99283 EMERGENCY DEPT VISIT LOW MDM: CPT

## 2019-04-29 PROCEDURE — 10060 I&D ABSCESS SIMPLE/SINGLE: CPT | Performed by: PHYSICIAN ASSISTANT

## 2019-04-29 PROCEDURE — 99283 EMERGENCY DEPT VISIT LOW MDM: CPT | Performed by: PHYSICIAN ASSISTANT

## 2019-04-29 PROCEDURE — 96372 THER/PROPH/DIAG INJ SC/IM: CPT

## 2019-04-29 RX ORDER — CLINDAMYCIN HYDROCHLORIDE 150 MG/1
300 CAPSULE ORAL 4 TIMES DAILY
Qty: 56 CAPSULE | Refills: 0 | Status: SHIPPED | OUTPATIENT
Start: 2019-04-29 | End: 2019-05-06

## 2019-04-29 RX ORDER — KETOROLAC TROMETHAMINE 30 MG/ML
15 INJECTION, SOLUTION INTRAMUSCULAR; INTRAVENOUS ONCE
Status: COMPLETED | OUTPATIENT
Start: 2019-04-29 | End: 2019-04-29

## 2019-04-29 RX ORDER — CLINDAMYCIN HYDROCHLORIDE 150 MG/1
300 CAPSULE ORAL ONCE
Status: COMPLETED | OUTPATIENT
Start: 2019-04-29 | End: 2019-04-29

## 2019-04-29 RX ORDER — ACETAMINOPHEN 325 MG/1
650 TABLET ORAL ONCE
Status: COMPLETED | OUTPATIENT
Start: 2019-04-29 | End: 2019-04-29

## 2019-04-29 RX ORDER — LIDOCAINE HYDROCHLORIDE 10 MG/ML
5 INJECTION, SOLUTION EPIDURAL; INFILTRATION; INTRACAUDAL; PERINEURAL ONCE
Status: COMPLETED | OUTPATIENT
Start: 2019-04-29 | End: 2019-04-29

## 2019-04-29 RX ADMIN — CLINDAMYCIN HYDROCHLORIDE 300 MG: 150 CAPSULE ORAL at 20:33

## 2019-04-29 RX ADMIN — LIDOCAINE HYDROCHLORIDE 5 ML: 10 INJECTION, SOLUTION EPIDURAL; INFILTRATION; INTRACAUDAL; PERINEURAL at 20:34

## 2019-04-29 RX ADMIN — ACETAMINOPHEN 650 MG: 325 TABLET, FILM COATED ORAL at 20:33

## 2019-04-29 RX ADMIN — KETOROLAC TROMETHAMINE 15 MG: 30 INJECTION, SOLUTION INTRAMUSCULAR; INTRAVENOUS at 20:33

## 2019-07-16 ENCOUNTER — ANESTHESIA EVENT (INPATIENT)
Dept: PERIOP | Facility: HOSPITAL | Age: 29
DRG: 872 | End: 2019-07-16

## 2019-07-16 ENCOUNTER — ANESTHESIA (INPATIENT)
Dept: PERIOP | Facility: HOSPITAL | Age: 29
DRG: 872 | End: 2019-07-16

## 2019-07-16 ENCOUNTER — APPOINTMENT (EMERGENCY)
Dept: CT IMAGING | Facility: HOSPITAL | Age: 29
DRG: 872 | End: 2019-07-16

## 2019-07-16 ENCOUNTER — HOSPITAL ENCOUNTER (INPATIENT)
Facility: HOSPITAL | Age: 29
LOS: 3 days | Discharge: HOME | DRG: 872 | End: 2019-07-19
Attending: EMERGENCY MEDICINE | Admitting: GENERAL PRACTICE

## 2019-07-16 DIAGNOSIS — L02.419 ABSCESS OF ELBOW: ICD-10-CM

## 2019-07-16 DIAGNOSIS — A41.9 SEPSIS (HCC): Primary | ICD-10-CM

## 2019-07-16 DIAGNOSIS — L02.414 ABSCESS OF LEFT ARM: ICD-10-CM

## 2019-07-16 PROBLEM — F19.90 IVDU (INTRAVENOUS DRUG USER): Status: ACTIVE | Noted: 2019-07-16

## 2019-07-16 PROBLEM — R74.01 TRANSAMINITIS: Status: ACTIVE | Noted: 2019-07-16

## 2019-07-16 LAB
ALBUMIN SERPL BCP-MCNC: 3.7 G/DL (ref 3.5–5)
ALP SERPL-CCNC: 102 U/L (ref 46–116)
ALT SERPL W P-5'-P-CCNC: 393 U/L (ref 12–78)
AMPHETAMINES SERPL QL SCN: POSITIVE
ANION GAP SERPL CALCULATED.3IONS-SCNC: 9 MMOL/L (ref 4–13)
APTT PPP: 29 SECONDS (ref 23–37)
AST SERPL W P-5'-P-CCNC: 129 U/L (ref 5–45)
BARBITURATES UR QL: NEGATIVE
BASOPHILS # BLD AUTO: 0.05 THOUSANDS/ΜL (ref 0–0.1)
BASOPHILS NFR BLD AUTO: 0 % (ref 0–1)
BENZODIAZ UR QL: NEGATIVE
BILIRUB SERPL-MCNC: 1.3 MG/DL (ref 0.2–1)
BUN SERPL-MCNC: 19 MG/DL (ref 5–25)
CALCIUM SERPL-MCNC: 9.6 MG/DL (ref 8.3–10.1)
CHLORIDE SERPL-SCNC: 101 MMOL/L (ref 100–108)
CO2 SERPL-SCNC: 26 MMOL/L (ref 21–32)
COCAINE UR QL: NEGATIVE
CREAT SERPL-MCNC: 0.72 MG/DL (ref 0.6–1.3)
EOSINOPHIL # BLD AUTO: 0.14 THOUSAND/ΜL (ref 0–0.61)
EOSINOPHIL NFR BLD AUTO: 1 % (ref 0–6)
ERYTHROCYTE [DISTWIDTH] IN BLOOD BY AUTOMATED COUNT: 13.8 % (ref 11.6–15.1)
GFR SERPL CREATININE-BSD FRML MDRD: 126 ML/MIN/1.73SQ M
GLUCOSE SERPL-MCNC: 98 MG/DL (ref 65–140)
HAV IGM SER QL: ABNORMAL
HBV CORE IGM SER QL: ABNORMAL
HBV SURFACE AG SER QL: ABNORMAL
HCT VFR BLD AUTO: 48.5 % (ref 36.5–49.3)
HCV AB SER QL: ABNORMAL
HGB BLD-MCNC: 16.7 G/DL (ref 12–17)
IMM GRANULOCYTES # BLD AUTO: 0.05 THOUSAND/UL (ref 0–0.2)
IMM GRANULOCYTES NFR BLD AUTO: 0 % (ref 0–2)
INR PPP: 1.11 (ref 0.84–1.19)
LACTATE SERPL-SCNC: 1.1 MMOL/L (ref 0.5–2)
LYMPHOCYTES # BLD AUTO: 3.11 THOUSANDS/ΜL (ref 0.6–4.47)
LYMPHOCYTES NFR BLD AUTO: 21 % (ref 14–44)
MCH RBC QN AUTO: 29.7 PG (ref 26.8–34.3)
MCHC RBC AUTO-ENTMCNC: 34.4 G/DL (ref 31.4–37.4)
MCV RBC AUTO: 86 FL (ref 82–98)
METHADONE UR QL: NEGATIVE
MONOCYTES # BLD AUTO: 1.26 THOUSAND/ΜL (ref 0.17–1.22)
MONOCYTES NFR BLD AUTO: 8 % (ref 4–12)
NEUTROPHILS # BLD AUTO: 10.38 THOUSANDS/ΜL (ref 1.85–7.62)
NEUTS SEG NFR BLD AUTO: 70 % (ref 43–75)
NRBC BLD AUTO-RTO: 0 /100 WBCS
OPIATES UR QL SCN: NEGATIVE
PCP UR QL: NEGATIVE
PLATELET # BLD AUTO: 232 THOUSANDS/UL (ref 149–390)
PMV BLD AUTO: 10.3 FL (ref 8.9–12.7)
POTASSIUM SERPL-SCNC: 3.8 MMOL/L (ref 3.5–5.3)
PROT SERPL-MCNC: 7.4 G/DL (ref 6.4–8.2)
PROTHROMBIN TIME: 14.4 SECONDS (ref 11.6–14.5)
RBC # BLD AUTO: 5.63 MILLION/UL (ref 3.88–5.62)
SODIUM SERPL-SCNC: 136 MMOL/L (ref 136–145)
THC UR QL: NEGATIVE
WBC # BLD AUTO: 14.99 THOUSAND/UL (ref 4.31–10.16)

## 2019-07-16 PROCEDURE — 96375 TX/PRO/DX INJ NEW DRUG ADDON: CPT

## 2019-07-16 PROCEDURE — 0J9H0ZX DRAINAGE OF LEFT LOWER ARM SUBCUTANEOUS TISSUE AND FASCIA, OPEN APPROACH, DIAGNOSTIC: ICD-10-PCS | Performed by: SURGERY

## 2019-07-16 PROCEDURE — 87040 BLOOD CULTURE FOR BACTERIA: CPT | Performed by: EMERGENCY MEDICINE

## 2019-07-16 PROCEDURE — 87075 CULTR BACTERIA EXCEPT BLOOD: CPT | Performed by: SURGERY

## 2019-07-16 PROCEDURE — 80307 DRUG TEST PRSMV CHEM ANLYZR: CPT | Performed by: PHYSICIAN ASSISTANT

## 2019-07-16 PROCEDURE — 10061 I&D ABSCESS COMP/MULTIPLE: CPT | Performed by: SURGERY

## 2019-07-16 PROCEDURE — 85610 PROTHROMBIN TIME: CPT | Performed by: EMERGENCY MEDICINE

## 2019-07-16 PROCEDURE — 83605 ASSAY OF LACTIC ACID: CPT | Performed by: EMERGENCY MEDICINE

## 2019-07-16 PROCEDURE — 99285 EMERGENCY DEPT VISIT HI MDM: CPT | Performed by: EMERGENCY MEDICINE

## 2019-07-16 PROCEDURE — 99253 IP/OBS CNSLTJ NEW/EST LOW 45: CPT | Performed by: SURGERY

## 2019-07-16 PROCEDURE — 87070 CULTURE OTHR SPECIMN AEROBIC: CPT | Performed by: SURGERY

## 2019-07-16 PROCEDURE — 87181 SC STD AGAR DILUTION PER AGT: CPT | Performed by: SURGERY

## 2019-07-16 PROCEDURE — 85730 THROMBOPLASTIN TIME PARTIAL: CPT | Performed by: EMERGENCY MEDICINE

## 2019-07-16 PROCEDURE — 87077 CULTURE AEROBIC IDENTIFY: CPT | Performed by: SURGERY

## 2019-07-16 PROCEDURE — 10061 I&D ABSCESS COMP/MULTIPLE: CPT | Performed by: CLINICAL NURSE SPECIALIST

## 2019-07-16 PROCEDURE — 99285 EMERGENCY DEPT VISIT HI MDM: CPT

## 2019-07-16 PROCEDURE — 80074 ACUTE HEPATITIS PANEL: CPT | Performed by: PHYSICIAN ASSISTANT

## 2019-07-16 PROCEDURE — 96374 THER/PROPH/DIAG INJ IV PUSH: CPT

## 2019-07-16 PROCEDURE — 80053 COMPREHEN METABOLIC PANEL: CPT | Performed by: EMERGENCY MEDICINE

## 2019-07-16 PROCEDURE — 87205 SMEAR GRAM STAIN: CPT | Performed by: SURGERY

## 2019-07-16 PROCEDURE — 99223 1ST HOSP IP/OBS HIGH 75: CPT | Performed by: INTERNAL MEDICINE

## 2019-07-16 PROCEDURE — 36415 COLL VENOUS BLD VENIPUNCTURE: CPT | Performed by: EMERGENCY MEDICINE

## 2019-07-16 PROCEDURE — 73201 CT UPPER EXTREMITY W/DYE: CPT

## 2019-07-16 PROCEDURE — 85025 COMPLETE CBC W/AUTO DIFF WBC: CPT | Performed by: EMERGENCY MEDICINE

## 2019-07-16 PROCEDURE — 96365 THER/PROPH/DIAG IV INF INIT: CPT

## 2019-07-16 PROCEDURE — 99254 IP/OBS CNSLTJ NEW/EST MOD 60: CPT | Performed by: PHYSICIAN ASSISTANT

## 2019-07-16 RX ORDER — SODIUM CHLORIDE, SODIUM LACTATE, POTASSIUM CHLORIDE, CALCIUM CHLORIDE 600; 310; 30; 20 MG/100ML; MG/100ML; MG/100ML; MG/100ML
75 INJECTION, SOLUTION INTRAVENOUS CONTINUOUS
Status: DISCONTINUED | OUTPATIENT
Start: 2019-07-16 | End: 2019-07-16

## 2019-07-16 RX ORDER — ACETAMINOPHEN 325 MG/1
650 TABLET ORAL EVERY 6 HOURS PRN
Status: DISCONTINUED | OUTPATIENT
Start: 2019-07-16 | End: 2019-07-16

## 2019-07-16 RX ORDER — FENTANYL CITRATE 50 UG/ML
INJECTION, SOLUTION INTRAMUSCULAR; INTRAVENOUS AS NEEDED
Status: DISCONTINUED | OUTPATIENT
Start: 2019-07-16 | End: 2019-07-16 | Stop reason: SURG

## 2019-07-16 RX ORDER — 0.9 % SODIUM CHLORIDE 0.9 %
3 VIAL (ML) INJECTION AS NEEDED
Status: DISCONTINUED | OUTPATIENT
Start: 2019-07-16 | End: 2019-07-19 | Stop reason: HOSPADM

## 2019-07-16 RX ORDER — ONDANSETRON 2 MG/ML
INJECTION INTRAMUSCULAR; INTRAVENOUS AS NEEDED
Status: DISCONTINUED | OUTPATIENT
Start: 2019-07-16 | End: 2019-07-16 | Stop reason: SURG

## 2019-07-16 RX ORDER — HYDROMORPHONE HCL/PF 1 MG/ML
0.5 SYRINGE (ML) INJECTION
Status: DISCONTINUED | OUTPATIENT
Start: 2019-07-16 | End: 2019-07-16 | Stop reason: HOSPADM

## 2019-07-16 RX ORDER — ONDANSETRON 2 MG/ML
4 INJECTION INTRAMUSCULAR; INTRAVENOUS ONCE AS NEEDED
Status: DISCONTINUED | OUTPATIENT
Start: 2019-07-16 | End: 2019-07-16 | Stop reason: HOSPADM

## 2019-07-16 RX ORDER — PROPOFOL 10 MG/ML
INJECTION, EMULSION INTRAVENOUS AS NEEDED
Status: DISCONTINUED | OUTPATIENT
Start: 2019-07-16 | End: 2019-07-16 | Stop reason: SURG

## 2019-07-16 RX ORDER — MIDAZOLAM HYDROCHLORIDE 1 MG/ML
INJECTION INTRAMUSCULAR; INTRAVENOUS AS NEEDED
Status: DISCONTINUED | OUTPATIENT
Start: 2019-07-16 | End: 2019-07-16 | Stop reason: SURG

## 2019-07-16 RX ORDER — KETOROLAC TROMETHAMINE 30 MG/ML
15 INJECTION, SOLUTION INTRAMUSCULAR; INTRAVENOUS ONCE
Status: COMPLETED | OUTPATIENT
Start: 2019-07-16 | End: 2019-07-16

## 2019-07-16 RX ORDER — LIDOCAINE HYDROCHLORIDE 10 MG/ML
INJECTION, SOLUTION INFILTRATION; PERINEURAL AS NEEDED
Status: DISCONTINUED | OUTPATIENT
Start: 2019-07-16 | End: 2019-07-16 | Stop reason: SURG

## 2019-07-16 RX ORDER — KETOROLAC TROMETHAMINE 30 MG/ML
15 INJECTION, SOLUTION INTRAMUSCULAR; INTRAVENOUS EVERY 6 HOURS PRN
Status: DISPENSED | OUTPATIENT
Start: 2019-07-16 | End: 2019-07-18

## 2019-07-16 RX ORDER — OXYCODONE HYDROCHLORIDE AND ACETAMINOPHEN 5; 325 MG/1; MG/1
1 TABLET ORAL EVERY 4 HOURS PRN
Status: DISCONTINUED | OUTPATIENT
Start: 2019-07-16 | End: 2019-07-19 | Stop reason: HOSPADM

## 2019-07-16 RX ORDER — VANCOMYCIN HYDROCHLORIDE 1 G/200ML
15 INJECTION, SOLUTION INTRAVENOUS ONCE
Status: COMPLETED | OUTPATIENT
Start: 2019-07-16 | End: 2019-07-16

## 2019-07-16 RX ORDER — HYDROMORPHONE HCL/PF 1 MG/ML
SYRINGE (ML) INJECTION AS NEEDED
Status: DISCONTINUED | OUTPATIENT
Start: 2019-07-16 | End: 2019-07-16 | Stop reason: SURG

## 2019-07-16 RX ORDER — SODIUM CHLORIDE, SODIUM LACTATE, POTASSIUM CHLORIDE, CALCIUM CHLORIDE 600; 310; 30; 20 MG/100ML; MG/100ML; MG/100ML; MG/100ML
INJECTION, SOLUTION INTRAVENOUS CONTINUOUS PRN
Status: DISCONTINUED | OUTPATIENT
Start: 2019-07-16 | End: 2019-07-16 | Stop reason: SURG

## 2019-07-16 RX ORDER — SODIUM CHLORIDE, SODIUM GLUCONATE, SODIUM ACETATE, POTASSIUM CHLORIDE, MAGNESIUM CHLORIDE, SODIUM PHOSPHATE, DIBASIC, AND POTASSIUM PHOSPHATE .53; .5; .37; .037; .03; .012; .00082 G/100ML; G/100ML; G/100ML; G/100ML; G/100ML; G/100ML; G/100ML
1000 INJECTION, SOLUTION INTRAVENOUS ONCE
Status: DISCONTINUED | OUTPATIENT
Start: 2019-07-16 | End: 2019-07-19 | Stop reason: HOSPADM

## 2019-07-16 RX ORDER — MAGNESIUM HYDROXIDE 1200 MG/15ML
LIQUID ORAL AS NEEDED
Status: DISCONTINUED | OUTPATIENT
Start: 2019-07-16 | End: 2019-07-16 | Stop reason: HOSPADM

## 2019-07-16 RX ORDER — VANCOMYCIN HYDROCHLORIDE 1 G/200ML
15 INJECTION, SOLUTION INTRAVENOUS EVERY 8 HOURS
Status: DISCONTINUED | OUTPATIENT
Start: 2019-07-16 | End: 2019-07-17

## 2019-07-16 RX ORDER — METOCLOPRAMIDE HYDROCHLORIDE 5 MG/ML
10 INJECTION INTRAMUSCULAR; INTRAVENOUS ONCE AS NEEDED
Status: DISCONTINUED | OUTPATIENT
Start: 2019-07-16 | End: 2019-07-16 | Stop reason: HOSPADM

## 2019-07-16 RX ORDER — FENTANYL CITRATE/PF 50 MCG/ML
25 SYRINGE (ML) INJECTION
Status: DISCONTINUED | OUTPATIENT
Start: 2019-07-16 | End: 2019-07-16 | Stop reason: HOSPADM

## 2019-07-16 RX ORDER — ONDANSETRON 2 MG/ML
4 INJECTION INTRAMUSCULAR; INTRAVENOUS EVERY 6 HOURS PRN
Status: DISCONTINUED | OUTPATIENT
Start: 2019-07-16 | End: 2019-07-19 | Stop reason: HOSPADM

## 2019-07-16 RX ADMIN — FENTANYL CITRATE 50 MCG: 50 INJECTION, SOLUTION INTRAMUSCULAR; INTRAVENOUS at 15:25

## 2019-07-16 RX ADMIN — FENTANYL CITRATE 25 MCG: 50 INJECTION INTRAMUSCULAR; INTRAVENOUS at 16:37

## 2019-07-16 RX ADMIN — MIDAZOLAM HYDROCHLORIDE 2 MG: 1 INJECTION, SOLUTION INTRAMUSCULAR; INTRAVENOUS at 15:20

## 2019-07-16 RX ADMIN — OXYCODONE HYDROCHLORIDE AND ACETAMINOPHEN 1 TABLET: 5; 325 TABLET ORAL at 10:58

## 2019-07-16 RX ADMIN — SODIUM CHLORIDE 1000 ML: 0.9 INJECTION, SOLUTION INTRAVENOUS at 05:11

## 2019-07-16 RX ADMIN — PROPOFOL 300 MG: 10 INJECTION, EMULSION INTRAVENOUS at 15:23

## 2019-07-16 RX ADMIN — VANCOMYCIN HYDROCHLORIDE 1000 MG: 1 INJECTION, SOLUTION INTRAVENOUS at 06:54

## 2019-07-16 RX ADMIN — CEFEPIME HYDROCHLORIDE 2000 MG: 2 INJECTION, POWDER, FOR SOLUTION INTRAVENOUS at 19:56

## 2019-07-16 RX ADMIN — LIDOCAINE HYDROCHLORIDE 100 MG: 10 INJECTION, SOLUTION INFILTRATION; PERINEURAL at 15:22

## 2019-07-16 RX ADMIN — CEFEPIME HYDROCHLORIDE 2000 MG: 2 INJECTION, POWDER, FOR SOLUTION INTRAVENOUS at 05:12

## 2019-07-16 RX ADMIN — SODIUM CHLORIDE, SODIUM LACTATE, POTASSIUM CHLORIDE, AND CALCIUM CHLORIDE: .6; .31; .03; .02 INJECTION, SOLUTION INTRAVENOUS at 14:30

## 2019-07-16 RX ADMIN — FENTANYL CITRATE 25 MCG: 50 INJECTION INTRAMUSCULAR; INTRAVENOUS at 16:28

## 2019-07-16 RX ADMIN — OXYCODONE HYDROCHLORIDE AND ACETAMINOPHEN 1 TABLET: 5; 325 TABLET ORAL at 21:50

## 2019-07-16 RX ADMIN — FENTANYL CITRATE 50 MCG: 50 INJECTION, SOLUTION INTRAMUSCULAR; INTRAVENOUS at 15:21

## 2019-07-16 RX ADMIN — VANCOMYCIN HYDROCHLORIDE 1000 MG: 1 INJECTION, SOLUTION INTRAVENOUS at 23:20

## 2019-07-16 RX ADMIN — KETOROLAC TROMETHAMINE 15 MG: 30 INJECTION, SOLUTION INTRAMUSCULAR at 09:34

## 2019-07-16 RX ADMIN — HYDROMORPHONE HYDROCHLORIDE 1 MG: 1 INJECTION, SOLUTION INTRAMUSCULAR; INTRAVENOUS; SUBCUTANEOUS at 15:34

## 2019-07-16 RX ADMIN — KETOROLAC TROMETHAMINE 15 MG: 30 INJECTION, SOLUTION INTRAMUSCULAR at 05:09

## 2019-07-16 RX ADMIN — OXYCODONE HYDROCHLORIDE AND ACETAMINOPHEN 1 TABLET: 5; 325 TABLET ORAL at 17:25

## 2019-07-16 RX ADMIN — ONDANSETRON 4 MG: 2 INJECTION INTRAMUSCULAR; INTRAVENOUS at 15:25

## 2019-07-16 RX ADMIN — CEFEPIME HYDROCHLORIDE 2000 MG: 2 INJECTION, POWDER, FOR SOLUTION INTRAVENOUS at 12:57

## 2019-07-16 RX ADMIN — IOHEXOL 100 ML: 350 INJECTION, SOLUTION INTRAVENOUS at 06:52

## 2019-07-16 NOTE — OP NOTE
OPERATIVE REPORT  PATIENT NAME: Leann Gil    :  1990  MRN: [id number]  Pt Location: [facility] OR ROOM 04    SURGERY DATE: 2019    Surgeon(s) and Role:     * Gina Zuñiga MD - Primary  Assistant:  Дмитрий Egan, [facility]    Preop Diagnosis:  Abscess of left arm [L02 414]    Post-Op Diagnosis Codes:     * Abscess of left arm [L02 414]    Procedure(s) (LRB):  INCISION AND DRAINAGE (I&D) EXTREMITY (Left), open wound packing    Specimen(s):  Cultures for aerobic and anaerobic    Estimated Blood Loss:   Minimal    Drains:  None    Anesthesia Type:   Conscious Sedation     Operative Indications:  Abscess of left arm [L02 414]    Operative Findings:  The patient had moderate size abscess on the lower 3rd of the arm measure approximately a cm in the largest diameter  Moderate amount of pus  Complications:   None    Procedure and Technique:  The patient was identified in the patient was placed in the operating table in a supine position  After adequate anesthesia induction and satisfactory LMA intubation the left arm was prepped and draped in sterile usual fashion with ChloraPrep  Time-out was called the patient was identified as was surgical site  An elliptical incision was made over the abscess area with a scalpel, taken down through the subcutaneous tissue with scalpel dissection, the abscess was entered copious amount of pus was drained  Cultures were taken for aerobic and anaerobic  The wound was copiously irrigated with salt solution  Hemostasis was accomplished with cautery and the wound was packed open with 1 in packing  Sterile dressing was applied  At the end of the case instrument, needles, and sponges counts were correct  Patient tolerated procedure well     I was present for the entire procedure, A qualified resident physician was not available and A physician assistant was required during the procedure for retraction tissue handling,dissection and suturing    Patient Disposition:  PACU , hemodynamically stable and extubated and stable    SIGNATURE: Jamarcus Zarate MD  DATE: July 16, 2019  TIME: 3:39 PM

## 2019-07-16 NOTE — ANESTHESIA POSTPROCEDURE EVALUATION
Post-Op Assessment Note    CV Status:  Stable  Pain Score: 0    Pain management: adequate     Mental Status:  Alert and awake   Hydration Status:  Stable   PONV Controlled:  None   Airway Patency:  Patent and adequate   Post Op Vitals Reviewed: Yes      Staff: Anesthesiologist, CRNA           /67 (07/16/19 1556)    Temp (!) 97 1 °F (36 2 °C) (07/16/19 1556)    Pulse 83 (07/16/19 1556)   Resp   18   SpO2   100%

## 2019-07-16 NOTE — PROGRESS NOTES
Vancomycin Assessment    Candelario Cooks is a 34 y o  male who is currently receiving vancomycin 1000 mg IV once for skin-soft tissue infection     Relevant clinical data and objective history reviewed:  Creatinine   Date Value Ref Range Status   07/16/2019 0 72 0 60 - 1 30 mg/dL Final     Comment:     Standardized to IDMS reference method   06/28/2017 1 19 0 60 - 1 30 mg/dL Final     Comment:     Standardized to IDMS reference method     /89 (BP Location: Right arm)   Pulse 92   Temp 97 7 °F (36 5 °C) (Oral)   Resp 18   Ht 5' 9" (1 753 m)   Wt 67 6 kg (149 lb 0 5 oz)   SpO2 100%   BMI 22 01 kg/m²   I/O last 3 completed shifts: In: 48 [IV Piggyback:50]  Out: -   Lab Results   Component Value Date/Time    BUN 19 07/16/2019 05:34 AM    WBC 14 99 (H) 07/16/2019 04:57 AM    HGB 16 7 07/16/2019 04:57 AM    HCT 48 5 07/16/2019 04:57 AM    MCV 86 07/16/2019 04:57 AM     07/16/2019 04:57 AM     Temp Readings from Last 3 Encounters:   07/16/19 97 7 °F (36 5 °C) (Oral)   04/29/19 (!) 100 8 °F (38 2 °C) (Oral)   10/13/18 98 °F (36 7 °C) (Oral)     Vancomycin Days of Therapy: 1    Assessment/Plan  The patient is currently on vancomycin utilizing scheduled dosing based on actual body weight  Baseline risks associated with therapy include: concomitant nephrotoxic medications and dehydration  The patient is currently receiving 1000 mg IV once and after clinical evaluation will be changed to 1000 mg IV q 8 h  Pharmacy will also follow closely for s/sx of nephrotoxicity, infusion reactions and appropriateness of therapy  BMP and CBC will be ordered per protocol  Plan for trough as patient approaches steady state, prior to the 4th  dose at approximately 1430 on 7/17/19  Due to infection severity, will target a trough of 15-20 (appropriate for most indications)   Pharmacy will continue to follow the patients culture results and clinical progress daily      Quincy Prater, Pharmacist

## 2019-07-16 NOTE — ED PROVIDER NOTES
History  Chief Complaint   Patient presents with    Abscess     patient brought in via ems; patient states abscess on his arm appeared around 2 days ago; patient believes abscess may have been caused by an insect bite  patient has been taking doxycycline since the abscess appeared     49-year-old male presents with swelling and erythema of his left elbow that started on Sunday and progressively has worsened  Patient has difficulty with range of motion due to the significance of the swelling  Patient states he felt something "bite him" while going to bed but was unable to find any insects or other items that may have caused this feeling  Patient denies IV drug use stating that he is "8 months clean "    Impression and plan:  Erythema and swelling with limited range of motion concerning for potential deep space abscess and infection  Will obtain septic evaluation and CT imaging of patient's elbow to evaluate for extent of the infection  Will start patient on broad-spectrum antibiotics considering possibility of joint involvement  Patient denies use of IV drug however patient has a history and symptoms concerning for potential injection so will treat patient with broad-spectrum antibiotics until able to be reassessed by surgery  Will monitor and reassess regarding disposition        Abscess - Complicated   Location:  Shoulder/arm  Shoulder/arm abscess location:  L elbow  Abscess quality: induration, painful and redness    Red streaking: yes    Progression:  Worsening  Pain details:     Quality:  Dull    Severity:  Moderate    Timing:  Constant    Progression:  Worsening  Context: insect bite/sting    Context: not diabetes, not immunosuppression, not injected drug use and not skin injury    Relieved by:  None tried  Worsened by:  Nothing  Ineffective treatments:  None tried  Associated symptoms: no anorexia, no fatigue, no fever, no headaches, no nausea and no vomiting        Prior to Admission Medications Prescriptions Last Dose Informant Patient Reported? Taking?   neomycin-polymyxin-hydrocortisone (CORTISPORIN) 0 35%-10,000 units/mL-1% otic suspension   No No   Sig: Administer 4 drops into ears every 6 (six) hours for 7 days      Facility-Administered Medications: None       History reviewed  No pertinent past medical history  Past Surgical History:   Procedure Laterality Date    NEPHROSTOMY      since removed    PARATHYROID GLAND SURGERY      TUMOR REMOVAL      parathyroid gland       History reviewed  No pertinent family history  I have reviewed and agree with the history as documented  Social History     Tobacco Use    Smoking status: Former Smoker     Packs/day: 0 50     Types: Cigarettes    Smokeless tobacco: Never Used   Substance Use Topics    Alcohol use: Never     Frequency: Never     Comment: occ    Drug use: Never        Review of Systems   Constitutional: Negative for fatigue and fever  Gastrointestinal: Negative for anorexia, nausea and vomiting  Neurological: Negative for headaches  Physical Exam  Physical Exam   Constitutional: He appears well-developed and well-nourished  He appears distressed  HENT:   Head: Atraumatic  Eyes: Pupils are equal, round, and reactive to light  Neck: Normal range of motion  Neck supple  Cardiovascular: Regular rhythm  Tachycardic  Pulmonary/Chest: Effort normal and breath sounds normal    Abdominal: Soft  Bowel sounds are normal  He exhibits no distension  There is no tenderness  Musculoskeletal: He exhibits tenderness  He exhibits no deformity  See picture for erythema and swelling, there is induration with no clear fluctuance  There is significant loss of range of motion at the elbow due to the pain  Neurological: He is alert  Skin: Skin is warm and dry  Rash noted  He is not diaphoretic  There is erythema  Psychiatric: He has a normal mood and affect  Vitals reviewed        Vital Signs  ED Triage Vitals Temperature Pulse Respirations Blood Pressure SpO2   07/16/19 0431 07/16/19 0431 07/16/19 0431 07/16/19 0431 07/16/19 0431   97 7 °F (36 5 °C) (!) 111 18 127/82 95 %      Temp Source Heart Rate Source Patient Position - Orthostatic VS BP Location FiO2 (%)   07/16/19 0431 07/16/19 0431 07/16/19 0431 07/16/19 0431 --   Oral Monitor Sitting Right arm       Pain Score       07/16/19 0509       Worst Possible Pain           Vitals:    07/16/19 1641 07/16/19 1645 07/16/19 2341 07/17/19 0250   BP: 142/85 138/80 116/64 115/65   Pulse: 87 88 80 79   Patient Position - Orthostatic VS:   Lying Lying         Visual Acuity      ED Medications  Medications   sodium chloride (PF) 0 9 % injection 3 mL ( Intravenous MAR Unhold 7/16/19 1549)   multi-electrolyte (ISOLYTE-S PH 7 4) bolus 1,000 mL ( Intravenous MAR Unhold 7/16/19 1549)   cefepime (MAXIPIME) 2,000 mg in dextrose 5 % 50 mL IVPB (2,000 mg Intravenous New Bag 7/17/19 0400)   ketorolac (TORADOL) injection 15 mg ( Intravenous MAR Unhold 7/16/19 1549)   vancomycin (VANCOCIN) IVPB (premix) 1,000 mg (1,000 mg Intravenous New Bag 7/17/19 0628)   ondansetron (ZOFRAN) injection 4 mg ( Intravenous MAR Unhold 7/16/19 1549)   oxyCODONE-acetaminophen (PERCOCET) 5-325 mg per tablet 1 tablet (1 tablet Oral Given 7/17/19 0302)   sodium chloride 0 9 % bolus 1,000 mL (0 mL Intravenous Stopped 7/16/19 0611)   ketorolac (TORADOL) injection 15 mg (15 mg Intravenous Given 7/16/19 0509)   vancomycin (VANCOCIN) IVPB (premix) 1,000 mg (0 mg/kg × 67 6 kg Intravenous Stopped 7/16/19 0754)   cefepime (MAXIPIME) 2,000 mg in dextrose 5 % 50 mL IVPB (0 mg Intravenous Stopped 7/16/19 0542)   iohexol (OMNIPAQUE) 350 MG/ML injection (MULTI-DOSE) 100 mL (100 mL Intravenous Given 7/16/19 6139)       Diagnostic Studies  Results Reviewed     Procedure Component Value Units Date/Time    Rapid drug screen, urine [[id number]]  (Abnormal) Collected:  07/16/19 1008    Lab Status:  Final result Specimen:  Urine, Clean Catch Updated:  07/16/19 1029     Amph/Meth UR Positive     Barbiturate Ur Negative     Benzodiazepine Urine Negative     Cocaine Urine Negative     Methadone Urine Negative     Opiate Urine Negative     PCP Ur Negative     THC Urine Negative    Narrative:       FOR MEDICAL PURPOSES ONLY  IF CONFIRMATION NEEDED PLEASE CONTACT THE LAB WITHIN 5 DAYS      Drug Screen Cutoff Levels:  AMPHETAMINE/METHAMPHETAMINES  1000 ng/mL  BARBITURATES     200 ng/mL  BENZODIAZEPINES     200 ng/mL  COCAINE      300 ng/mL  METHADONE      300 ng/mL  OPIATES      300 ng/mL  PHENCYCLIDINE     25 ng/mL  THC       50 ng/mL      Protime-INR [[id number]]  (Normal) Collected:  07/16/19 0533    Lab Status:  Final result Specimen:  Blood from Arm, Right Updated:  07/16/19 0605     Protime 14 4 seconds      INR 1 11    APTT [[id number]]  (Normal) Collected:  07/16/19 0533    Lab Status:  Final result Specimen:  Blood from Arm, Right Updated:  07/16/19 0605     PTT 29 seconds     Comprehensive metabolic panel [[id number]]  (Abnormal) Collected:  07/16/19 0534    Lab Status:  Final result Specimen:  Blood from Arm, Right Updated:  07/16/19 0603     Sodium 136 mmol/L      Potassium 3 8 mmol/L      Chloride 101 mmol/L      CO2 26 mmol/L      ANION GAP 9 mmol/L      BUN 19 mg/dL      Creatinine 0 72 mg/dL      Glucose 98 mg/dL      Calcium 9 6 mg/dL       U/L       U/L      Alkaline Phosphatase 102 U/L      Total Protein 7 4 g/dL      Albumin 3 7 g/dL      Total Bilirubin 1 30 mg/dL      eGFR 126 ml/min/1 73sq m     Narrative:       [facility] guidelines for Chronic Kid[facility] Disease (CKD):     Stage 1 with normal or high GFR (GFR > 90 mL/min/1 73 square meters)    Stage 2 Mild CKD (GFR = 60-89 mL/min/1 73 square meters)    Stage 3A Moderate CKD (GFR = 45-59 mL/min/1 73 square meters)    Stage 3B Moderate CKD (GFR = 30-44 mL/min/1 73 square meters)    Stage 4 Severe CKD (GFR = 15-29 mL/min/1 73 square meters)    Stage 5 End Stage CKD (GFR <15 mL/min/1 73 square meters)  Note: GFR calculation is accurate only with a steady state creatinine    Lactic Acid x2 [[id number]]  (Normal) Collected:  07/16/19 0457    Lab Status:  Final result Specimen:  Blood from Arm, Right Updated:  07/16/19 0528     LACTIC ACID 1 1 mmol/L     Narrative:       Result may be elevated if tourniquet was used during collection  Blood culture #1 [[id number]] Collected:  07/16/19 0505    Lab Status: In process Specimen:  Blood from Hand, Right Updated:  07/16/19 0508    CBC and differential [[id number]]  (Abnormal) Collected:  07/16/19 0457    Lab Status:  Final result Specimen:  Blood from Arm, Right Updated:  07/16/19 0505     WBC 14 99 Thousand/uL      RBC 5 63 Million/uL      Hemoglobin 16 7 g/dL      Hematocrit 48 5 %      MCV 86 fL      MCH 29 7 pg      MCHC 34 4 g/dL      RDW 13 8 %      MPV 10 3 fL      Platelets 788 Thousands/uL      nRBC 0 /100 WBCs      Neutrophils Relative 70 %      Immat GRANS % 0 %      Lymphocytes Relative 21 %      Monocytes Relative 8 %      Eosinophils Relative 1 %      Basophils Relative 0 %      Neutrophils Absolute 10 38 Thousands/µL      Immature Grans Absolute 0 05 Thousand/uL      Lymphocytes Absolute 3 11 Thousands/µL      Monocytes Absolute 1 26 Thousand/µL      Eosinophils Absolute 0 14 Thousand/µL      Basophils Absolute 0 05 Thousands/µL     Blood culture #2 [[id number]] Collected:  07/16/19 0457    Lab Status: In process Specimen:  Blood from Arm, Right Updated:  07/16/19 0502                 CT elbow left w contrast   Final Result by Belle Lauren MD (07/16 6546)      Cellulitis distal lateral upper arm extending to the posterolateral proximal forearm  4 5 x 3 5 x 3 cm distal lateral upper arm subcutaneous abscess with subfascial extension into the underlying proximal brachioradialis muscle  The study was marked in [facility] for immediate notification              Workstation performed: [id number]                    Procedures  Procedures       ED Course  ED Course as of Jul 17 0725   Tu Jul 16, 2019   2336 Patient with modest elevation in his liver enzymes along with elevated bilirubin  Patient has no right upper quadrant tenderness  Discussed the need for continued follow-up and re-evaluation with primary care if he has recurrent episodes of upper abdominal tenderness    AST(!): 129                   Initial Sepsis Screening     Row Name 07/16/19 0514                Is the patient's history suggestive of a new or worsening infection? (!) Yes (Proceed)  -ML        Suspected source of infection  soft tissue  -ML        Are two or more of the following signs & symptoms of infection both present and new to the patient? (!) Yes (Proceed)  -ML        Indicate SIRS criteria  Leukocytosis (WBC > 19162 IJL); Tachycardia > 90 bpm  -ML        If the answer is yes to both questions, suspicion of sepsis is present          If severe sepsis is present AND tissue hypoperfusion perists in the hour after fluid resuscitation or lactate > 4, the patient meets criteria for SEPTIC SHOCK          Are any of the following organ dysfunction criteria present within 6 hours of suspected infection and SIRS criteria that are NOT considered to be chronic conditions?         Organ dysfunction          Date of presentation of severe sepsis          Time of presentation of severe sepsis          Tissue hypoperfusion persists in the hour after crystalloid fluid administration, evidenced, by either:          Was hypotension present within one hour of the conclusion of crystalloid fluid administration?           Date of presentation of septic shock          Time of presentation of septic shock            User Key  (r) = Recorded By, (t) = Taken By, (c) = Cosigned By    [address] Name Provider Kimberli Neff MD Physician                  MDM    Disposition  Final diagnoses:   Sepsis ([address] )   Abscess of left arm     Time reflects when diagnosis was documented in both MDM as applicable and the Disposition within this note     Time User Action Codes Description Comment    7/16/2019  7:00 AM Alethea Adam Add [A41 9] Sepsis ([address] )     7/16/2019  7:00 AM Alethea Baxter Add [L02 414] Abscess of left arm     7/16/2019  3:47 PM Jeremy Killian Modify [L02 414] Abscess of left arm       ED Disposition     ED Disposition Condition Date/Time Comment    Admit Stable Tue Jul 16, 2019  7:00 AM Case was discussed with FREDDY and the patient's admission status was agreed to be Admission Status: inpatient status to the service of Dr Noel Monet Junior     Follow up With Specialties Details Why Contact Info    Emilia Orellana MD General Surgery Schedule an appointment as soon as possible for a visit in 2 week(s)  [address]  [address]  [phone]            Current Discharge Medication List      CONTINUE these medications which have NOT CHANGED    Details   neomycin-polymyxin-hydrocortisone (CORTISPORIN) 0 35%-10,000 units/mL-1% otic suspension Administer 4 drops into ears every 6 (six) hours for 7 days  Qty: 10 mL, Refills: 0    Associated Diagnoses: Otitis externa           No discharge procedures on file      ED Provider  Electronically Signed by           Naomi Baez MD  07/17/19 7144

## 2019-07-16 NOTE — H&P
H&P- Angelica Funes 1990, 34 y o  male MRN: [id number]    Unit/Bed#: ED 24 Encounter: [id number]    Primary Care Provider: No primary care provider on file  Date and time admitted to hospital: 7/16/2019  4:26 AM        * Abscess of elbow  Assessment & Plan  · CT scan of the left elbow was not officially read-however I did take a look with ED physician it appears that there is extensive cellulitis and abscess present, it does not appear to be extending into the joint   · Patient started on IV cefepime and vancomycin in the ED, I agree with this antibiotic patient does have a history of IV drug abuse  He denies using IV drugs in the last 8 months  · Follow-up with UD screen  · Apply ice/heat to affected area  · F/u blood cultures  · I did consult Orthopedic surgery and general surgery  · Ketorlac for pain control        Sepsis ([facility] [address] )  Assessment & Plan  · POA   · Evidenced by tachycardia, leukocytosis, presence of large abscess   · Started on cefepime and vanco, c/w abx  · Pharmacy consulted for vanco   · F/u with blood cultures     IVDU (intravenous drug user)  Assessment & Plan  · Patient is with h/o IVDA  · He states that that he has been clean x 8 months and has not been injecting   · Cefepime and vanco started in the ED , I agree, will c/w abx   · F/u w/ blood cultures   · Elevatted liver enzymes-check acute hepatitis panel       Transaminitis  Assessment & Plan  · Check trend liver enzymes  · Check hepatitis panel   · No TTP in abdomen  · Consider getting liver ultrasound    VTE Prophylaxis: pt is low risk   / sequential compression device   Code Status: full code   POLST: POLST is not applicable to this patient  Discussion with family:     Anticipated Length of Stay:  Patient will be admitted on an Inpatient basis with an anticipated length of stay of   2 midnights  Justification for Hospital Stay:     Total Time for Visit, including Counseling / Coordination of Care: 1 hour    Greater than 50% of this total time spent on direct patient counseling and coordination of care  Chief Complaint:   Left elbow abscess, patient can't extend left arm     History of Present Illness:    Teresa Pastor is a 34 y o  male who presents with enlarged, swollen, tender, hard abscess located over the left elbow  Patient reports that symptoms started about 2 days ago and rapidly worsened  When he woke up in the middle of the night he was unable to extend his left elbow and was in the a lot of pain  Also complains of chills  He has a History of IV drug abuse but denies any recent drug abuse last 8 months  Patient states that something crawled in his bed and bit him  Denies any fever, shortness of breath, nausea, vomiting, numbness, tingling or chest pain  Review of Systems:    Review of Systems   Constitutional: Positive for chills  Musculoskeletal:        Left arm swelling, unable to extend arm    All other systems reviewed and are negative  Past Medical and Surgical History:     History reviewed  No pertinent past medical history  Past Surgical History:   Procedure Laterality Date    NEPHROSTOMY      since removed    PARATHYROID GLAND SURGERY      TUMOR REMOVAL      parathyroid gland       Meds/Allergies:    Prior to Admission medications    Medication Sig Start Date End Date Taking?  Authorizing Provider   neomycin-polymyxin-hydrocortisone (CORTISPORIN) 0 35%-10,000 units/mL-1% otic suspension Administer 4 drops into ears every 6 (six) hours for 7 days 10/13/18 10/20/18  Bela Guerra PA-C     none     Allergies: No Known Allergies    Social History:     Marital Status: Single     Substance Use History:   Social History     Substance and Sexual Activity   Alcohol Use Not Currently    Comment: occ     Social History     Tobacco Use   Smoking Status Former Smoker    Packs/day: 0 50    Types: Cigarettes   Smokeless Tobacco Never Used     Social History     Substance and Sexual Activity   Drug Use Not Currently       Family History:    History reviewed  No pertinent family history  Physical Exam:     Vitals:   Blood Pressure: 159/89 (07/16/19 0657)  Pulse: 92 (07/16/19 0657)  Temperature: 97 7 °F (36 5 °C) (07/16/19 0431)  Temp Source: Oral (07/16/19 0431)  Respirations: 18 (07/16/19 0657)  Height: 5' 9" (175 3 cm) (07/16/19 0431)  Weight - Scale: 67 6 kg (149 lb 0 5 oz) (07/16/19 0431)  SpO2: 100 % (07/16/19 0657)    Physical Exam   Constitutional: He is oriented to person, place, and time  No distress  Neck: Normal range of motion  Cardiovascular: Normal rate and regular rhythm  Pulmonary/Chest: Effort normal and breath sounds normal  No respiratory distress  Abdominal: Soft  Bowel sounds are normal  There is no tenderness  Musculoskeletal: He exhibits edema, tenderness and deformity  Extremely large, more, tender abscess located over lateral aspect of the  left elbow  Patient is able to extend his arm which is an improvement from earlier in the night, there is no loss of sensation, pulse intact  Neurological: He is alert and oriented to person, place, and time  Skin: There is erythema  Additional Data:     Lab Results: I have personally reviewed pertinent reports        Results from last 7 days   Lab Units 07/16/19  0457   WBC Thousand/uL 14 99*   HEMOGLOBIN g/dL 16 7   HEMATOCRIT % 48 5   PLATELETS Thousands/uL 232   NEUTROS PCT % 70   LYMPHS PCT % 21   MONOS PCT % 8   EOS PCT % 1     Results from last 7 days   Lab Units 07/16/19  0534   SODIUM mmol/L 136   POTASSIUM mmol/L 3 8   CHLORIDE mmol/L 101   CO2 mmol/L 26   BUN mg/dL 19   CREATININE mg/dL 0 72   ANION GAP mmol/L 9   CALCIUM mg/dL 9 6   ALBUMIN g/dL 3 7   TOTAL BILIRUBIN mg/dL 1 30*   ALK PHOS U/L 102   ALT U/L 393*   AST U/L 129*   GLUCOSE RANDOM mg/dL 98     Results from last 7 days   Lab Units 07/16/19  0533   INR  1 11             Results from last 7 days   Lab Units 07/16/19  0457   LACTIC ACID mmol/L 1 1 Imaging: I have personally reviewed pertinent reports  CT elbow left w contrast   Final Result by Jennifer Watkins MD (07/16 3907)      Cellulitis distal lateral upper arm extending to the posterolateral proximal forearm  4 5 x 3 5 x 3 cm distal lateral upper arm subcutaneous abscess with subfascial extension into the underlying proximal brachioradialis muscle  The study was marked in [facility] for immediate notification  Workstation performed: [id number]             EKG, Pathology, and Other Studies Reviewed on Admission:   · EKG:     Allscripts / Epic Records Reviewed: Yes     ** Please Note: This note has been constructed using a voice recognition system   **

## 2019-07-16 NOTE — ASSESSMENT & PLAN NOTE
· CT scan of the left elbow was not officially read-however I did take a look with ED physician it appears that there is extensive cellulitis and abscess present, it does not appear to be extending into the joint   · Patient started on IV cefepime and vancomycin in the ED, I agree with this antibiotic patient does have a history of IV drug abuse    He denies using IV drugs in the last 8 months  · Follow-up with UD screen  · Apply ice/heat to affected area  · F/u blood cultures  · I did consult Orthopedic surgery and general surgery  · Ketorlac for pain control

## 2019-07-16 NOTE — ASSESSMENT & PLAN NOTE
· Patient is with h/o IVDA  · He states that that he has been clean x 8 months and has not been injecting   · Cefepime and vanco started in the ED , I agree, will c/w abx   · F/u w/ blood cultures   · Elevatted liver enzymes-check acute hepatitis panel

## 2019-07-16 NOTE — CONSULTS
Orthopedics   Dana Kelley 34 y o  male MRN: [id number]  Unit/Bed#: PACU 4      Chief Complaint:   left elbow pain    HPI:   29 y o male complaining of left elbow erythema and pain  Orthopedics was consult for abscess and cellulitis the left arm just above his elbow  He has a history of IV drug abuse, he states he has been sober for 8 months  He did have a junction ring that was positive for methamphetamines  Patient states that he started to develop this small lump over his left elbow over the last few days  He states he had antibiotics from a previous I and D that he try to take which did not seem to help  He presented emergency room for evaluation  Patient was admitted for IV antibiotics and for an abscess to the left elbow  Much of the history was obtained from his brother  Patient denies any numbness or tingling  He denies any fevers, chills, nausea, vomiting, diarrhea, constipation, she chest pain, shortness of breath, lightheadedness, dizziness  Review Of Systems:   · Skin: Normal  · Neuro: See HPI  · Musculoskeletal: See HPI  · 14 point review of systems negative except as stated above     Past Medical History:   History reviewed  No pertinent past medical history  Past Surgical History:   Past Surgical History:   Procedure Laterality Date    NEPHROSTOMY      since removed    PARATHYROID GLAND SURGERY      TUMOR REMOVAL      parathyroid gland       Family History:  Family history reviewed and non-contributory  History reviewed  No pertinent family history      Social History:  Social History     Socioeconomic History    Marital status: Single     Spouse name: None    Number of children: None    Years of education: None    Highest education level: None   Occupational History    None   Social Needs    Financial resource strain: None    Food insecurity:     Worry: None     Inability: None    Transportation needs:     Medical: None     Non-medical: None   Tobacco Use    Smoking status: Former Smoker     Packs/day: 0 50     Types: Cigarettes    Smokeless tobacco: Never Used   Substance and Sexual Activity    Alcohol use: Never     Frequency: Never     Comment: occ    Drug use: Never    Sexual activity: Never   Lifestyle    Physical activity:     Days per week: None     Minutes per session: None    Stress: None   Relationships    Social connections:     Talks on phone: None     Gets together: None     Attends Orthodoxy service: None     Active member of club or organization: None     Attends meetings of clubs or organizations: None     Relationship status: None    Intimate partner violence:     Fear of current or ex partner: None     Emotionally abused: None     Physically abused: None     Forced sexual activity: None   Other Topics Concern    None   Social History Narrative    None       Allergies:   No Known Allergies        Labs:  0   Lab Value Date/Time    HCT 48 5 07/16/2019 0457    HCT 45 11/11/2017 1220    HCT 47 4 06/28/2017 2320    HGB 16 7 07/16/2019 0457    HGB 15 3 11/11/2017 1220    HGB 16 5 06/28/2017 2320    INR 1 11 07/16/2019 0533    WBC 14 99 (H) 07/16/2019 0457    WBC 8 08 06/28/2017 2320       Meds:    Current Facility-Administered Medications:     cefepime (MAXIPIME) 2,000 mg in dextrose 5 % 50 mL IVPB, 2,000 mg, Intravenous, Q8H, Aileen Lewis PA-C, Last Rate: 100 mL/hr at 07/16/19 1257, 2,000 mg at 07/16/19 1257    fentaNYL (SUBLIMAZE) injection 25 mcg, 25 mcg, Intravenous, Q5 Min PRN, Robin Berry CRNA, 25 mcg at 07/16/19 1628    HYDROmorphone (DILAUDID) injection 0 5 mg, 0 5 mg, Intravenous, Q5 Min PRN, Robin Berry CRNA    ketorolac (TORADOL) injection 15 mg, 15 mg, Intravenous, Q6H PRN, Aileen Lewis PA-C, 15 mg at 07/16/19 0934    metoclopramide (REGLAN) injection 10 mg, 10 mg, Intravenous, Once PRN, Robin Berry CRNA    multi-electrolyte (ISOLYTE-S PH 7 4) bolus 1,000 mL, 1,000 mL, Intravenous, Once, Aileen Lewis PA-C, Stopped at 07/16/19 0636    ondansetron (ZOFRAN) injection 4 mg, 4 mg, Intravenous, Q6H PRN, Kt Monge PA-C    ondansetron [facility]) injection 4 mg, 4 mg, Intravenous, Once PRN, Jose Swain CRNA    oxyCODONE-acetaminophen (PERCOCET) 5-325 mg per tablet 1 tablet, 1 tablet, Oral, Q4H PRN, Kt Monge PA-C, 1 tablet at 07/16/19 1058    sodium chloride (PF) 0 9 % injection 3 mL, 3 mL, Intravenous, PRN, Kt Monge PA-C    vancomycin (VANCOCIN) IVPB (premix) 1,000 mg, 15 mg/kg, Intravenous, Q8H, Kt Monge PA-C    Blood Culture:   No results found for: BLOODCX    Wound Culture:   No results found for: WOUNDCULT    Ins and Outs:  I/O last 24 hours: In: 2050 [I V :800; IV Piggyback:1250]  Out: 400 [Urine:400]          Physical Exam:   /94   Pulse 86   Temp (!) 97 1 °F (36 2 °C)   Resp 20   Ht 5' 9" (1 753 m)   Wt 67 6 kg (149 lb 0 5 oz)   SpO2 100%   BMI 22 01 kg/m²   Gen: Alert and oriented to person, place, time  HEENT: EOMI, eyes clear, moist mucus membranes, hearing intact  Respiratory: Bilateral chest rise  No audible wheezing found  Cardiovascular: Regular Rate and Rhythm  Abdomen: soft nontender/nondistended  · Musculoskeletal: leftUpper Extremity  · Skin: Erythema over left elbow  · Painful range of motion of left elbow  · Sensation intact Radial, Ulna and Median  · 5/5 motor to Radial, Ulna and Median    Radiology:   I personally reviewed the films  CT scan of the left elbow demonstrates a 4 5 x 3 5 x 3 cm distal lateral upper arm subcutaneous abscess with subfascial extension into the underlying proximal brachial radialis muscle     _*_*_*_*_*_*_*_*_*_*_*_*_*_*_*_*_*_*_*_*_*_*_*_*_*_*_*_*_*_*_*_*_*_*_*_*_*_*_*_*_*    Assessment:  29 y o male with  left elbow cellulitis  Plan:   · Cont   abx per primary team  · Weightbearing as tolerated left upper extremity  · Analgesics for pain as per primary team  · Dispo: Ortho signing off, patient was taken to the [address] [address] today by General surgery for abscess drainage  Will defer to General surgery for treatment of abscess  If any other orthopedic concerns arise, please re-consult  Norma Li PA-C

## 2019-07-16 NOTE — ASSESSMENT & PLAN NOTE
· Check trend liver enzymes  · Check hepatitis panel   · No TTP in abdomen  · Consider getting liver ultrasound

## 2019-07-16 NOTE — PLAN OF CARE
Problem: PAIN - ADULT  Goal: Verbalizes/displays adequate comfort level or baseline comfort level  Description  Interventions:  - Encourage patient to monitor pain and request assistance  - Assess pain using appropriate pain scale  - Administer analgesics based on type and severity of pain and evaluate response  - Implement non-pharmacological measures as appropriate and evaluate response  - Consider cultural and social influences on pain and pain management  - Notify physician/advanced practitioner if interventions unsuccessful or patient reports new pain  Outcome: Progressing     Problem: INFECTION - ADULT  Goal: Absence or prevention of progression during hospitalization  Description  INTERVENTIONS:  - Assess and monitor for signs and symptoms of infection  - Monitor lab/diagnostic results  - Monitor all insertion sites, i e  indwelling lines, tubes, and drains  - Monitor endotracheal (as able) and nasal secretions for changes in amount and color  - [place] appropriate cooling/warming therapies per order  - Administer medications as ordered  - Instruct and encourage patient and family to use good hand hygiene technique  - Identify and instruct in appropriate isolation precautions for identified infection/condition  Outcome: Progressing     Problem: DISCHARGE PLANNING  Goal: Discharge to home or other facility with appropriate resources  Description  INTERVENTIONS:  - Identify barriers to discharge w/patient and caregiver  - Arrange for needed discharge resources and transportation as appropriate  - Identify discharge learning needs (meds, wound care, etc )  - Arrange for interpretive services to assist at discharge as needed  - Refer to Case Management Department for coordinating discharge planning if the patient needs post-hospital services based on physician/advanced practitioner order or complex needs related to functional status, cognitive ability, or social support system  Outcome: Progressing Problem: Knowledge Deficit  Goal: Patient/family/caregiver demonstrates understanding of disease process, treatment plan, medications, and discharge instructions  Description  Complete learning assessment and assess knowledge base    Interventions:  - Provide teaching at level of understanding  - Provide teaching via preferred learning methods  Outcome: Progressing

## 2019-07-16 NOTE — CONSULTS
GENERAL SURGERY CONSULT                                                                                                                                               Ras De Leon 34 y o  male MRN:                                                                     [id number]  Unit/Bed#: -01                                                         Encounter: [id number]                                                        Assessment/Plan Image in chart  Cellulitis and abscess of left arm above elbow  Sepsis  -NPO  -IV antibiotics  Maxipime and Vanco   -Warm packs and elevation  Substance abuse, Urine Drug screen + amphetamines and methamphetamines  Pt states that he has been sober for 8 months    -poss withdrawal management per [facility]     Mild Transaminitis   -cont to trend per SLIM        CHIEF COMPLAINT:  Pt does not offer much history he relies on his brother to answer most of history    HPI: Ras De Leon is a 34y o  year old male consulted for abscess and cellulitis of left arm above his elbow  History of IVDA, sober x 8 months  However, his drug screen is +for Amp/Meth  This has been present x 48 hours as a small lump with a little bit of redness  Last night it became much bigger, erythematous and very painful  He denies f/c/n/v/d/c  He denies chest pain or SOB  He had a history of abscess of one of 4th  fingers on his right hand  He was given p o  Antibiotic and it was I & D in the ED and discharged home  No h/o MRSA  WBC 14   CT Cellulitis distal lateral upper arm extending to the posterolateral proximal forearm      4 5 x 3 5 x 3 cm distal lateral upper arm subcutaneous abscess with subfascial extension into the underlying proximal brachioradialis muscle  Media Information      Document Information     Clinical Image - Mobile Device      07/16/2019 10:48   Attached To:    Hospital Encounter on 7/16/19   Source Information     [facility]         Inpatient consult to Acute Care Surgery  Consult performed by: Roldan Martinez PA-C  Consult ordered by: Vilma Guallpa PA-C          Historical Information   History reviewed  No pertinent past medical history  Past Surgical History:   Procedure Laterality Date    NEPHROSTOMY      since removed    PARATHYROID GLAND SURGERY      TUMOR REMOVAL      parathyroid gland     Social History   Social History     Substance and Sexual Activity   Alcohol Use Never    Frequency: Never    Comment: occ     Social History     Substance and Sexual Activity   Drug Use Never     Social History     Tobacco Use   Smoking Status Former Smoker    Packs/day: 0 50    Types: Cigarettes   Smokeless Tobacco Never Used     Family History: non-contributory    No Known Allergies    Meds/Allergies   all current active meds have been reviewed           Objective   Vitals: Blood pressure 143/83, pulse 89, temperature 98 4 °F (36 9 °C), resp  rate 18, height 5' 9" (1 753 m), weight 67 6 kg (149 lb 0 5 oz), SpO2 99 %  Intake/Output Summary (Last 24 hours) at 7/16/2019 1054  Last data filed at 7/16/2019 0754  Gross per 24 hour   Intake 1250 ml   Output    Net 1250 ml     Invasive Devices     Peripheral Intravenous Line            Peripheral IV 07/16/19 Right Antecubital less than 1 day                Review of Systems  12 point ROS reviewed and Negative   H/o IVDA, no use in 8 months  Abscess in left arm   pain in left arm abscess    Physical Exam    GEN: A & O x 3, cooperative  Pt does not answer much of the history taking  He relies on his brother to do most of the speaking  HEENT: PERRLA EOMI, sclera anicterus, oral mucosa  Moist   NECK: supple   LUNGS: clear throughout  COR: RRR no murmur  ABD: soft nontender, NBS  EXTREM:  no joint deformities  Abscess with fluctuance and induration measuring 6 cm x 4 cm of left antecubital area  Unable to fully extend his elbow  Full flexion   Tenderness to the area with erythema of the ju-wound  Full motor and sensory of hand   strength L=R      NEURO: CN II -XII intact, no tremor, appropriate is inappropriate with not answering questions and lets his brother answer for him  He is A & O x 3,     Lab Results:   Lab Results   Component Value Date    WBC 14 99 (H) 07/16/2019    HGB 16 7 07/16/2019    HCT 48 5 07/16/2019     07/16/2019     Lab Results   Component Value Date    K 3 8 07/16/2019     07/16/2019    CO2 26 07/16/2019    CO2 26 11/11/2017    BUN 19 07/16/2019    CREATININE 0 72 07/16/2019    GLUCOSE 84 11/11/2017     Lab Results   Component Value Date     (H) 07/16/2019     (H) 07/16/2019    ALKPHOS 102 07/16/2019    TBILI 1 30 (H) 07/16/2019    ALB 3 7 07/16/2019     Imaging Studies: Ct Elbow Left W Contrast    Result Date: 7/16/2019  Impression: Cellulitis distal lateral upper arm extending to the posterolateral proximal forearm  4 5 x 3 5 x 3 cm distal lateral upper arm subcutaneous abscess with subfascial extension into the underlying proximal brachioradialis muscle  The study was marked in [facility] for immediate notification   Workstation performed: [id number]         Tim Clark PA-C  7/16/2019

## 2019-07-16 NOTE — ED NOTES
Patient transported to [address] [place] [address], [address] [place] [address]  07/16/19 9767

## 2019-07-16 NOTE — ED NOTES
1  CC-Abcess   HX OF IV Drug use   denies using for 8 months  Ortho surgery consult    2  Orientation status-A&O x3    3  Abnormal labs/ focused assessment/ vitals- WBC 14 99    4  Medications/ drips Vanco given   cefepime given    5  Narcotic time/ pain medications-none    6  IV lines/drains/ etc 20G RT AC    7  Isolation status-none    8  Skin abscess on arm   dx cellulitis    9  Ambulation status-independant    10   ED phone number [phone]     Joo Badillo RN  07/16/19 Zoë Green RN  07/16/19 7972

## 2019-07-16 NOTE — SEPSIS NOTE
Sepsis Note   Martha Kim 34 y o  male MRN: [id number]  Unit/Bed#: ED 24 Encounter: [id number]      qSOFA     [address] Name 07/16/19 0431                Altered mental status GCS < 15          Respiratory Rate > / =10  0        Systolic BP < / =350  0        Q Sofa Score  0            Initial Sepsis Screening     Row Name 07/16/19 0514                Is the patient's history suggestive of a new or worsening infection? (!) Yes (Proceed)  -ML        Suspected source of infection  soft tissue  -ML        Are two or more of the following signs & symptoms of infection both present and new to the patient? (!) Yes (Proceed)  -ML        Indicate SIRS criteria  Leukocytosis (WBC > 18324 IJL); Tachycardia > 90 bpm  -ML        If the answer is yes to both questions, suspicion of sepsis is present          If severe sepsis is present AND tissue hypoperfusion perists in the hour after fluid resuscitation or lactate > 4, the patient meets criteria for SEPTIC SHOCK          Are any of the following organ dysfunction criteria present within 6 hours of suspected infection and SIRS criteria that are NOT considered to be chronic conditions?         Organ dysfunction          Date of presentation of severe sepsis          Time of presentation of severe sepsis          Tissue hypoperfusion persists in the hour after crystalloid fluid administration, evidenced, by either:          Was hypotension present within one hour of the conclusion of crystalloid fluid administration?           Date of presentation of septic shock          Time of presentation of septic shock            User Key  (r) = Recorded By, (t) = Taken By, (c) = Cosigned By    [address] Name Provider Luz Luna MD Physician

## 2019-07-16 NOTE — ASSESSMENT & PLAN NOTE
· POA   · Evidenced by tachycardia, leukocytosis, presence of large abscess   · Started on cefepime and vanco, c/w abx  · Pharmacy consulted for vanco   · F/u with blood cultures

## 2019-07-16 NOTE — SOCIAL WORK
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  Pt does drive but fisher snot currently have a care  He relies on friends for transpotation  He may need assistance with transport on dsicharge if friends unable  Pt does not have a PCP, [employer] card provided for same  Pt uses [facility] for medications and denies any difficulty obtaining them  Pt reports a history of IV heroin abuse  Pt states he has been clean for the past 8 months  Pt went to detox/rehab treatment at [facility] in [facility] 8 months ago and states he has been clean from substances since  He denies any current outpatient treatment  Pt further denies any history of  treatment  CM to discuss potential for long term IV ABX with [facility] and issues regarding same given history of IV drug abuse  Pt denies any other needs at this time  CM to follow  CM reviewed d/c planning process including the following: identifying help at home, patient preference for d/c planning needs, availability of treatment team to discuss questions or concerns patient and/or family may have regarding understanding medications and recognizing signs and symptoms once discharged  CM also encouraged patient to follow up with all recommended appointments after discharge  Patient advised of importance for patient and family to participate in managing patients medical well being  Patient/caregiver received discharge checklist   Content reviewed  Patient/caregiver encouraged to participate in discharge plan of care prior to discharge home

## 2019-07-16 NOTE — ANESTHESIA PREPROCEDURE EVALUATION
Review of Systems/Medical History  Patient summary reviewed  Chart reviewed      Cardiovascular  Negative cardio ROS Exercise tolerance (METS): >4,     Pulmonary  Negative pulmonary ROS        GI/Hepatic  Negative GI/hepatic ROS   Hepatitis C,        Negative  ROS        Endo/Other  Negative endo/other ROS      GYN  Negative gynecology ROS          Hematology  Negative hematology ROS      Musculoskeletal  Negative musculoskeletal ROS        Neurology  Negative neurology ROS      Psychology   Negative psychology ROS     Comment: +amphetamines on drug screen         Physical Exam    Airway    Mallampati score: II  TM Distance: >3 FB       Dental   No notable dental hx     Cardiovascular  Comment: Negative ROS, Rhythm: regular, Rate: normal,     Pulmonary  Pulmonary exam normal     Other Findings        Anesthesia Plan  ASA Score- 1     Anesthesia Type- IV sedation with anesthesia with ASA Monitors  Additional Monitors:   Airway Plan:         Plan Factors-Patient not instructed to abstain from smoking on day of procedure  Patient did not smoke on day of surgery  Induction- intravenous  Postoperative Plan-     Informed Consent- Anesthetic plan and risks discussed with patient  I personally reviewed this patient with the CRNA  Discussed and agreed on the Anesthesia Plan with the CRNA  Miko Luong

## 2019-07-17 LAB
ALBUMIN SERPL BCP-MCNC: 3.1 G/DL (ref 3.5–5)
ALP SERPL-CCNC: 95 U/L (ref 46–116)
ALT SERPL W P-5'-P-CCNC: 301 U/L (ref 12–78)
ANION GAP SERPL CALCULATED.3IONS-SCNC: 8 MMOL/L (ref 4–13)
APTT PPP: 31 SECONDS (ref 23–37)
AST SERPL W P-5'-P-CCNC: 111 U/L (ref 5–45)
BASOPHILS # BLD AUTO: 0.04 THOUSANDS/ΜL (ref 0–0.1)
BASOPHILS NFR BLD AUTO: 1 % (ref 0–1)
BILIRUB SERPL-MCNC: 0.9 MG/DL (ref 0.2–1)
BUN SERPL-MCNC: 9 MG/DL (ref 5–25)
CALCIUM SERPL-MCNC: 9 MG/DL (ref 8.3–10.1)
CHLORIDE SERPL-SCNC: 102 MMOL/L (ref 100–108)
CO2 SERPL-SCNC: 26 MMOL/L (ref 21–32)
CREAT SERPL-MCNC: 0.6 MG/DL (ref 0.6–1.3)
EOSINOPHIL # BLD AUTO: 0.18 THOUSAND/ΜL (ref 0–0.61)
EOSINOPHIL NFR BLD AUTO: 2 % (ref 0–6)
ERYTHROCYTE [DISTWIDTH] IN BLOOD BY AUTOMATED COUNT: 13.7 % (ref 11.6–15.1)
GFR SERPL CREATININE-BSD FRML MDRD: 136 ML/MIN/1.73SQ M
GLUCOSE SERPL-MCNC: 96 MG/DL (ref 65–140)
HCT VFR BLD AUTO: 42 % (ref 36.5–49.3)
HGB BLD-MCNC: 14.4 G/DL (ref 12–17)
IMM GRANULOCYTES # BLD AUTO: 0.02 THOUSAND/UL (ref 0–0.2)
IMM GRANULOCYTES NFR BLD AUTO: 0 % (ref 0–2)
INR PPP: 1.14 (ref 0.84–1.19)
LYMPHOCYTES # BLD AUTO: 2.42 THOUSANDS/ΜL (ref 0.6–4.47)
LYMPHOCYTES NFR BLD AUTO: 30 % (ref 14–44)
MAGNESIUM SERPL-MCNC: 2.1 MG/DL (ref 1.6–2.6)
MCH RBC QN AUTO: 29.8 PG (ref 26.8–34.3)
MCHC RBC AUTO-ENTMCNC: 34.3 G/DL (ref 31.4–37.4)
MCV RBC AUTO: 87 FL (ref 82–98)
MONOCYTES # BLD AUTO: 0.76 THOUSAND/ΜL (ref 0.17–1.22)
MONOCYTES NFR BLD AUTO: 10 % (ref 4–12)
NEUTROPHILS # BLD AUTO: 4.62 THOUSANDS/ΜL (ref 1.85–7.62)
NEUTS SEG NFR BLD AUTO: 57 % (ref 43–75)
NRBC BLD AUTO-RTO: 0 /100 WBCS
PHOSPHATE SERPL-MCNC: 3.6 MG/DL (ref 2.7–4.5)
PLATELET # BLD AUTO: 177 THOUSANDS/UL (ref 149–390)
PMV BLD AUTO: 10.5 FL (ref 8.9–12.7)
POTASSIUM SERPL-SCNC: 4 MMOL/L (ref 3.5–5.3)
PROT SERPL-MCNC: 6.6 G/DL (ref 6.4–8.2)
PROTHROMBIN TIME: 14.6 SECONDS (ref 11.6–14.5)
RBC # BLD AUTO: 4.84 MILLION/UL (ref 3.88–5.62)
SODIUM SERPL-SCNC: 136 MMOL/L (ref 136–145)
VANCOMYCIN TROUGH SERPL-MCNC: 7.2 UG/ML (ref 10–20)
WBC # BLD AUTO: 8.04 THOUSAND/UL (ref 4.31–10.16)

## 2019-07-17 PROCEDURE — 84100 ASSAY OF PHOSPHORUS: CPT | Performed by: CLINICAL NURSE SPECIALIST

## 2019-07-17 PROCEDURE — 85610 PROTHROMBIN TIME: CPT | Performed by: INTERNAL MEDICINE

## 2019-07-17 PROCEDURE — 80053 COMPREHEN METABOLIC PANEL: CPT | Performed by: CLINICAL NURSE SPECIALIST

## 2019-07-17 PROCEDURE — 80202 ASSAY OF VANCOMYCIN: CPT | Performed by: CLINICAL NURSE SPECIALIST

## 2019-07-17 PROCEDURE — 85025 COMPLETE CBC W/AUTO DIFF WBC: CPT | Performed by: CLINICAL NURSE SPECIALIST

## 2019-07-17 PROCEDURE — 83735 ASSAY OF MAGNESIUM: CPT | Performed by: CLINICAL NURSE SPECIALIST

## 2019-07-17 PROCEDURE — 85730 THROMBOPLASTIN TIME PARTIAL: CPT | Performed by: INTERNAL MEDICINE

## 2019-07-17 PROCEDURE — 99024 POSTOP FOLLOW-UP VISIT: CPT | Performed by: SURGERY

## 2019-07-17 PROCEDURE — 99233 SBSQ HOSP IP/OBS HIGH 50: CPT | Performed by: INTERNAL MEDICINE

## 2019-07-17 RX ADMIN — VANCOMYCIN HYDROCHLORIDE 1250 MG: 5 INJECTION, POWDER, LYOPHILIZED, FOR SOLUTION INTRAVENOUS at 17:35

## 2019-07-17 RX ADMIN — CEFEPIME HYDROCHLORIDE 2000 MG: 2 INJECTION, POWDER, FOR SOLUTION INTRAVENOUS at 04:00

## 2019-07-17 RX ADMIN — CEFEPIME HYDROCHLORIDE 2000 MG: 2 INJECTION, POWDER, FOR SOLUTION INTRAVENOUS at 12:19

## 2019-07-17 RX ADMIN — CEFEPIME HYDROCHLORIDE 2000 MG: 2 INJECTION, POWDER, FOR SOLUTION INTRAVENOUS at 20:17

## 2019-07-17 RX ADMIN — OXYCODONE HYDROCHLORIDE AND ACETAMINOPHEN 1 TABLET: 5; 325 TABLET ORAL at 18:28

## 2019-07-17 RX ADMIN — OXYCODONE HYDROCHLORIDE AND ACETAMINOPHEN 1 TABLET: 5; 325 TABLET ORAL at 22:36

## 2019-07-17 RX ADMIN — OXYCODONE HYDROCHLORIDE AND ACETAMINOPHEN 1 TABLET: 5; 325 TABLET ORAL at 03:02

## 2019-07-17 RX ADMIN — OXYCODONE HYDROCHLORIDE AND ACETAMINOPHEN 1 TABLET: 5; 325 TABLET ORAL at 09:26

## 2019-07-17 RX ADMIN — VANCOMYCIN HYDROCHLORIDE 1000 MG: 1 INJECTION, SOLUTION INTRAVENOUS at 06:28

## 2019-07-17 RX ADMIN — VANCOMYCIN HYDROCHLORIDE 1250 MG: 5 INJECTION, POWDER, LYOPHILIZED, FOR SOLUTION INTRAVENOUS at 21:15

## 2019-07-17 NOTE — PROGRESS NOTES
Progress Note - General Surgery   [facility] 34 y o  male MRN: [id number]  Unit/Bed#: MS Alexy-01 Encounter: [id number]      Assessment:   35-year-old male with cellulitis and abscess of left proximal forearm, s/p I&D on 7/17/19  - Leukocytosis has resolved today's 8 04 from 13 yesterday and patient is afebrile  - patient reports great improvement left arm pain    Plan:  - continue IV antibiotics  - continue diet as tolerated  - pain control p r n   - awaiting cultures-will readjust antibiotics depending on sensitivities  - packing to be removed tomorrow 07/18/2019  - continue with daily superficial dressing changes as needed  - continue to monitor vitals and lab results  - tentative discharge tomorrow    Subjective/Objective     Subjective:  No overnight events reported by nursing nor patient  Patient states that he is feeling much better today and versus pain to be 5/10 (previously 10/10)  Patient reports flatus but no bowel movement  Patient is tolerating diet without any reported nausea or vomiting  He denies any fevers, chills, chest pain, shortness of breath, palpitations, leg/calf pain  Objective:     Blood pressure 115/65, pulse 79, temperature 98 8 °F (37 1 °C), temperature source Oral, resp  rate 18, height 5' 9" (1 753 m), weight 67 6 kg (149 lb 0 5 oz), SpO2 99 %  Body mass index is 22 01 kg/m²      I/O       07/15 0701 - 07/16 0700 07/16 0701 - 07/17 0700 07/17 0701 - 07/18 0700    I V  (mL/kg)  800 (11 8)     IV Piggyback 1050 200     Total Intake(mL/kg) 1050 (15 5) 1000 (14 8)     Urine (mL/kg/hr)  400 (0 2)     Total Output  400     Net +1050 +600                  Invasive Devices     Peripheral Intravenous Line            Peripheral IV 07/16/19 Right Antecubital 1 day                Physical Exam: General appearance: alert and oriented, in no acute distress  Lungs: clear to auscultation bilaterally  Heart: regular rate and rhythm, S1, S2 normal, no murmur, click, rub or gallop  Abdomen: soft, non-tender; bowel sounds normal; no masses,  no organomegaly  Extremities: extremities normal, warm and well-perfused; no cyanosis, clubbing, or edema and Left forearm is neurovascular intact  Dressing is clean dry and intact      Labs   Recent Results (from the past 24 hour(s))   Rapid drug screen, urine    Collection Time: 07/16/19 10:08 AM   Result Value Ref Range    Amph/Meth UR Positive (A) Negative    Barbiturate Ur Negative Negative    Benzodiazepine Urine Negative Negative    Cocaine Urine Negative Negative    Methadone Urine Negative Negative    Opiate Urine Negative Negative    PCP Ur Negative Negative    THC Urine Negative Negative   Wound culture and Gram stain    Collection Time: 07/16/19  3:47 PM   Result Value Ref Range    Gram Stain Result 4+ Polys     Gram Stain Result No bacteria seen    Comprehensive metabolic panel    Collection Time: 07/17/19  5:43 AM   Result Value Ref Range    Sodium 136 136 - 145 mmol/L    Potassium 4 0 3 5 - 5 3 mmol/L    Chloride 102 100 - 108 mmol/L    CO2 26 21 - 32 mmol/L    ANION GAP 8 4 - 13 mmol/L    BUN 9 5 - 25 mg/dL    Creatinine 0 60 0 60 - 1 30 mg/dL    Glucose 96 65 - 140 mg/dL    Calcium 9 0 8 3 - 10 1 mg/dL     (H) 5 - 45 U/L     (H) 12 - 78 U/L    Alkaline Phosphatase 95 46 - 116 U/L    Total Protein 6 6 6 4 - 8 2 g/dL    Albumin 3 1 (L) 3 5 - 5 0 g/dL    Total Bilirubin 0 90 0 20 - 1 00 mg/dL    eGFR 136 ml/min/1 73sq m   Magnesium    Collection Time: 07/17/19  5:43 AM   Result Value Ref Range    Magnesium 2 1 1 6 - 2 6 mg/dL   Phosphorus    Collection Time: 07/17/19  5:43 AM   Result Value Ref Range    Phosphorus 3 6 2 7 - 4 5 mg/dL   CBC and differential    Collection Time: 07/17/19  5:43 AM   Result Value Ref Range    WBC 8 04 4 31 - 10 16 Thousand/uL    RBC 4 84 3 88 - 5 62 Million/uL    Hemoglobin 14 4 12 0 - 17 0 g/dL    Hematocrit 42 0 36 5 - 49 3 %    MCV 87 82 - 98 fL    MCH 29 8 26 8 - 34 3 pg    MCHC 34 3 31 4 - 37 4 g/dL RDW 13 7 11 6 - 15 1 %    MPV 10 5 8 9 - 12 7 fL    Platelets 143 536 - 385 Thousands/uL    nRBC 0 /100 WBCs    Neutrophils Relative 57 43 - 75 %    Immat GRANS % 0 0 - 2 %    Lymphocytes Relative 30 14 - 44 %    Monocytes Relative 10 4 - 12 %    Eosinophils Relative 2 0 - 6 %    Basophils Relative 1 0 - 1 %    Neutrophils Absolute 4 62 1 85 - 7 62 Thousands/µL    Immature Grans Absolute 0 02 0 00 - 0 20 Thousand/uL    Lymphocytes Absolute 2 42 0 60 - 4 47 Thousands/µL    Monocytes Absolute 0 76 0 17 - 1 22 Thousand/µL    Eosinophils Absolute 0 18 0 00 - 0 61 Thousand/µL    Basophils Absolute 0 04 0 00 - 0 10 Thousands/µL   APTT    Collection Time: 07/17/19  5:43 AM   Result Value Ref Range    PTT 31 23 - 37 seconds   Protime-INR    Collection Time: 07/17/19  5:43 AM   Result Value Ref Range    Protime 14 6 (H) 11 6 - 14 5 seconds    INR 1 14 0 84 - 1 19        Imaging and other studies:  Ct Elbow Left W Contrast    Result Date: 7/16/2019  Impression: Cellulitis distal lateral upper arm extending to the posterolateral proximal forearm  4 5 x 3 5 x 3 cm distal lateral upper arm subcutaneous abscess with subfascial extension into the underlying proximal brachioradialis muscle  The study was marked in [facility] for immediate notification   Workstation performed: [id number]       VTE Mechanical Prophylaxis: sequential compression device    Sahil Tim PA-C  7/17/2019

## 2019-07-17 NOTE — PROGRESS NOTES
Vancomycin Assessment    Venkata Carmona is a 34 y o  male who is currently receiving vancomycin 1000 mg IV q 8 h for skin-soft tissue infection     Relevant clinical data and objective history reviewed:  Creatinine   Date Value Ref Range Status   07/17/2019 0 60 0 60 - 1 30 mg/dL Final     Comment:     Standardized to IDMS reference method   07/16/2019 0 72 0 60 - 1 30 mg/dL Final     Comment:     Standardized to IDMS reference method   06/28/2017 1 19 0 60 - 1 30 mg/dL Final     Comment:     Standardized to IDMS reference method     /65 (BP Location: Right arm)   Pulse 79   Temp 98 8 °F (37 1 °C) (Oral)   Resp 18   Ht 5' 9" (1 753 m)   Wt 67 6 kg (149 lb 0 5 oz)   SpO2 100%   BMI 22 01 kg/m²   I/O last 3 completed shifts: In: 2050 [I V :800; IV Piggyback:1250]  Out: 400 [Urine:400]  Lab Results   Component Value Date/Time    BUN 9 07/17/2019 05:43 AM    WBC 8 04 07/17/2019 05:43 AM    HGB 14 4 07/17/2019 05:43 AM    HCT 42 0 07/17/2019 05:43 AM    MCV 87 07/17/2019 05:43 AM     07/17/2019 05:43 AM     Temp Readings from Last 3 Encounters:   07/17/19 98 8 °F (37 1 °C) (Oral)   04/29/19 (!) 100 8 °F (38 2 °C) (Oral)   10/13/18 98 °F (36 7 °C) (Oral)     Vancomycin Days of Therapy: 2    Assessment/Plan  The patient is currently on vancomycin utilizing scheduled dosing  Baseline risks associated with therapy include: none   The patient is receiving 1000 mg IV q 8 h with the most recent vancomycin level being not at steady-state and sub-therapeutic based on a goal of 15-20 (appropriate for most indications) ; therefore, after clinical evaluation will be changed to 1250 mg q6h   Pharmacy will continue to follow closely for s/sx of nephrotoxicity, infusion reactions and appropriateness of therapy  BMP and CBC will be ordered per protocol  Plan for trough as patient approaches steady state, prior to the 4th  dose at approximately 7/   Pharmacy will continue to follow the patients culture results and clinical progress daily      Chiqui Oreilly, Pharmacist

## 2019-07-17 NOTE — PLAN OF CARE
Problem: PAIN - ADULT  Goal: Verbalizes/displays adequate comfort level or baseline comfort level  Description  Interventions:  - Encourage patient to monitor pain and request assistance  - Assess pain using appropriate pain scale  - Administer analgesics based on type and severity of pain and evaluate response  - Implement non-pharmacological measures as appropriate and evaluate response  - Consider cultural and social influences on pain and pain management  - Notify physician/advanced practitioner if interventions unsuccessful or patient reports new pain  Outcome: Progressing     Problem: INFECTION - ADULT  Goal: Absence or prevention of progression during hospitalization  Description  INTERVENTIONS:  - Assess and monitor for signs and symptoms of infection  - Monitor lab/diagnostic results  - Monitor all insertion sites, i e  indwelling lines, tubes, and drains  - Monitor endotracheal (as able) and nasal secretions for changes in amount and color  - [place] appropriate cooling/warming therapies per order  - Administer medications as ordered  - Instruct and encourage patient and family to use good hand hygiene technique  - Identify and instruct in appropriate isolation precautions for identified infection/condition  Outcome: Progressing     Problem: DISCHARGE PLANNING  Goal: Discharge to home or other facility with appropriate resources  Description  INTERVENTIONS:  - Identify barriers to discharge w/patient and caregiver  - Arrange for needed discharge resources and transportation as appropriate  - Identify discharge learning needs (meds, wound care, etc )  - Arrange for interpretive services to assist at discharge as needed  - Refer to Case Management Department for coordinating discharge planning if the patient needs post-hospital services based on physician/advanced practitioner order or complex needs related to functional status, cognitive ability, or social support system  Outcome: Progressing Problem: Knowledge Deficit  Goal: Patient/family/caregiver demonstrates understanding of disease process, treatment plan, medications, and discharge instructions  Description  Complete learning assessment and assess knowledge base    Interventions:  - Provide teaching at level of understanding  - Provide teaching via preferred learning methods  Outcome: Progressing

## 2019-07-17 NOTE — UTILIZATION REVIEW
Initial Clinical Review    Admission: Date/Time/Statement: 7/16/19 @ 0701     Orders Placed This Encounter   Procedures    Inpatient Admission     Standing Status:   Standing     Number of Occurrences:   1     Order Specific Question:   Admitting Physician     Answer:   Mally Sanchez     Order Specific Question:   Level of Care     Answer:   Med Surg [16]     Order Specific Question:   Estimated length of stay     Answer:   More than 2 Midnights     Order Specific Question:   Certification     Answer:   I certify that inpatient services are medically necessary for this patient for a duration of greater than two midnights  See H&P and MD Progress Notes for additional information about the patient's course of treatment   Inpatient Admission     Standing Status:   Standing     Number of Occurrences:   1     Order Specific Question:   Admitting Physician     Answer:   Lu Gamboa [[id number]]     Order Specific Question:   Level of Care     Answer:   Med Surg [16]     Order Specific Question:   Estimated length of stay     Answer:   More than 2 Midnights     Order Specific Question:   Certification     Answer:   I certify that inpatient services are medically necessary for this patient for a duration of greater than two midnights  See H&P and MD Progress Notes for additional information about the patient's course of treatment  ED Arrival Information     Expected Arrival Acuity Means of Arrival Escorted By Service Admission Type    - 7/16/2019 04:26 Urgent Ambulance [facility] Urgent    Arrival Complaint    -        Chief Complaint   Patient presents with    Abscess     patient brought in via ems; patient states abscess on his arm appeared around 2 days ago; patient believes abscess may have been caused by an insect bite   patient has been taking doxycycline since the abscess appeared     Assessment/Plan:  33 yo male to ED from home w/ enlarged , swollen , tender hard abscess located over the L elbow  Began 2 days ago and rapidly worsening   Unable to extend arm and a lot of pain   C/o chills   Hx of IV drug abuse  Pt believes something crawled in his bed and bit him , no iv drug use for the last 8 months  Admitted to IP status for cellulitis and abscess   Will f/u UDS , ice and heat , F/U bld cx , ortho and surgical consult and toradol for pain control   PE : Extremely large, more, tender abscess located over lateral aspect of the  left elbow  Patient is able to extend his arm which is an improvement from earlier in the night, there is no loss of sensation, pulse intact  7/16 general surgery consult   Cellulitis and abscess L arm above lebow  NPO , IV abx , warm pack and elevation   UDS + amphetamines and meth ,poss withdraw mngt per SLIM       7/16 Op note    INCISION AND DRAINAGE (I&D) EXTREMITY (Left), open wound packing   Operative Findings:  The patient had moderate size abscess on the lower 3rd of the arm measure approximately a cm in the largest diameter  Moderate amount of pus              7/17 ortho consult   Cont abx per primary team , analgesics for pain , to OR for abscess drainage by surgery        ED Triage Vitals   Temperature Pulse Respirations Blood Pressure SpO2   07/16/19 0431 07/16/19 0431 07/16/19 0431 07/16/19 0431 07/16/19 0431   97 7 °F (36 5 °C) (!) 111 18 127/82 95 %      Temp Source Heart Rate Source Patient Position - Orthostatic VS BP Location FiO2 (%)   07/16/19 0431 07/16/19 0431 07/16/19 0431 07/16/19 0431 --   Oral Monitor Sitting Right arm       Pain Score       07/16/19 0509       Worst Possible Pain        Wt Readings from Last 1 Encounters:   07/16/19 67 6 kg (149 lb 0 5 oz)     Additional Vital Signs:   07/17/19 0926          100 %  None (Room air)       07/17/19 0250  98 8 °F (37 1 °C)  79  18  115/65  99 %  None (Room air)    Lying   07/16/19 2341  98 8 °F (37 1 °C)  80  18  116/64  100 %      Lying   07/16/19 1645   88  21  138/80  98 %         07/16/19 1641  98 4 °F (36 9 °C)  87  19  142/85  98 %  None (Room air)  X     07/16/19 1630    91  20  145/82  98 %  None (Room air)  X     07/16/19 1615    86  20  147/94  100 %  None (Room air)  X     07/16/19 1600    83  20  121/67  100 %  Simple mask  X     07/16/19 1556  97 1 °F (36 2 °C)Abnormal   83  18  119/67  99 %  Simple mask  X     07/16/19 1400  98 9 °F (37 2 °C)  91  20  152/84  100 %  None (Room air)       07/16/19 1045            None (Room air)       07/16/19 0822  98 4 °F (36 9 °C)  89    143/83  99 %         07/16/19 0657    92  18  159/89  100 %  None (Room air)         Pertinent Labs/Diagnostic Test Results:   7/16 CT L elbow   Cellulitis distal lateral upper arm extending to the posterolateral proximal forearm  4 5 x 3 5 x 3 cm distal lateral upper arm subcutaneous abscess with subfascial extension into the underlying proximal brachioradialis muscle       Results from last 7 days   Lab Units 07/17/19  0543 07/16/19  0457   WBC Thousand/uL 8 04 14 99*   HEMOGLOBIN g/dL 14 4 16 7   HEMATOCRIT % 42 0 48 5   PLATELETS Thousands/uL 177 232   NEUTROS ABS Thousands/µL 4 62 10 38*     Results from last 7 days   Lab Units 07/17/19  0543 07/16/19  0534   SODIUM mmol/L 136 136   POTASSIUM mmol/L 4 0 3 8   CHLORIDE mmol/L 102 101   CO2 mmol/L 26 26   ANION GAP mmol/L 8 9   BUN mg/dL 9 19   CREATININE mg/dL 0 60 0 72   EGFR ml/min/1 73sq m 136 126   CALCIUM mg/dL 9 0 9 6   MAGNESIUM mg/dL 2 1  --    PHOSPHORUS mg/dL 3 6  --      Results from last 7 days   Lab Units 07/17/19  0543 07/16/19  0534   AST U/L 111* 129*   ALT U/L 301* 393*   ALK PHOS U/L 95 102   TOTAL PROTEIN g/dL 6 6 7 4   ALBUMIN g/dL 3 1* 3 7   TOTAL BILIRUBIN mg/dL 0 90 1 30*     Results from last 7 days   Lab Units 07/17/19  0543 07/16/19  0534   GLUCOSE RANDOM mg/dL 96 98     Results from last 7 days   Lab Units 07/17/19  0543 07/16/19  0533   PROTIME seconds 14 6* 14 4   INR  1 14 1 11   PTT seconds 31 29     Results from last 7 days   Lab Units 07/16/19  0457   LACTIC ACID mmol/L 1 1     Results from last 7 days   Lab Units 07/16/19  0846   HEP B S AG  Non-reactive   HEP C AB  High Reactive*   HEP B C IGM  Non-reactive     Results from last 7 days   Lab Units 07/16/19  1008   AMPH/METH  Positive*   BARBITURATE UR  Negative   BENZODIAZEPINE UR  Negative   COCAINE UR  Negative   METHADONE URINE  Negative   OPIATE UR  Negative   PCP UR  Negative   THC UR  Negative     Results from last 7 days   Lab Units 07/16/19  1547 07/16/19  0505 07/16/19  0457   BLOOD CULTURE   --  No Growth at 24 hrs  No Growth at 24 hrs  GRAM STAIN RESULT  4+ Polys  No bacteria seen  --   --    WOUND CULTURE  Culture too young- will reincubate  --   --      ED Treatment:   Medication Administration from 07/16/2019 0426 to 07/16/2019 0333       Date/Time Order Dose Route Action     07/16/2019 0511 sodium chloride 0 9 % bolus 1,000 mL 1,000 mL Intravenous New Bag     07/16/2019 0509 ketorolac (TORADOL) injection 15 mg 15 mg Intravenous Given     07/16/2019 0654 vancomycin (VANCOCIN) IVPB (premix) 1,000 mg 1,000 mg Intravenous New Bag     07/16/2019 0512 cefepime (MAXIPIME) 2,000 mg in dextrose 5 % 50 mL IVPB 2,000 mg Intravenous New Bag        History reviewed  No pertinent past medical history    Present on Admission:  **None**      Admitting Diagnosis: Abscess [L02 91]  Abscess of left arm [L02 414]  [facility] ([facility] ) [A41 9]  Age/Sex: 34 y o  male  Admission Orders:    Current Facility-Administered Medications:  cefepime 2,000 mg Intravenous Q8H     ketorolac 15 mg Intravenous Q6H PRN x2    multi-electrolyte 1,000 mL Intravenous Once     ondansetron 4 mg Intravenous Q6H PRN     oxyCODONE-acetaminophen 1 tablet Oral Q4H PRN x6    sodium chloride (PF) 3 mL Intravenous PRN     vancomycin 15 mg/kg Intravenous Q8H       Wound care   Ice to affected area   Tele   Up and OOB as kei   NPO   SCD    IP CONSULT TO PHARMACY  IP CONSULT TO ACUTE CARE SURGERY  IP CONSULT TO [address] [facility], Utilization Review Department  Phone: [phone]; Fax [phone]  [email or web address]  ATTENTION: Please call with any questions or concerns to [phone]  and carefully listen to the prompts so that you are directed to the right person  Send all requests for admission clinical reviews, approved or denied determinations and any other requests to fax [phone]   All voicemails are confidential

## 2019-07-17 NOTE — PHYSICIAN ADVISOR
Current patient class: Inpatient  The patient is currently on Hospital Day: 2 at [address]      The patient was admitted to the hospital at [address] on 7/16/19 for the following diagnosis:  Abscess [L02 91]  Abscess of left arm [L02 414]  Sepsis ([facility] ) [A41 9]       There is documentation in the medical record of an expected length of stay of at least 2 midnights  The patient is therefore expected to satisfy the 2 midnight benchmark and given the 2 midnight presumption is appropriate for INPATIENT ADMISSION  Given this expectation of a satisfying stay, CMS instructs us that the patient is most often appropriate for inpatient admission under part A provided medical necessity is documented in the chart  After review of the relevant documentation, labs, vital signs and test results, the patient is appropriate for INPATIENT ADMISSION  Admission to the hospital as an inpatient is a complex decision making process which requires the practitioner to consider the patients presenting complaint, history and physical examination and all relevant testing  With this in mind, in this case, the patient was deemed appropriate for INPATIENT ADMISSION  After review of the documentation and testing available at the time of the admission I concur with this clinical determination of medical necessity  Rationale is as follows: The patient is a 34 yrs old Male who presented to the ED at 7/16/2019  4:26 AM with a chief complaint of Abscess (patient brought in via ems; patient states abscess on his arm appeared around 2 days ago; patient believes abscess may have been caused by an insect bite  patient has been taking doxycycline since the abscess appeared)  Patient came in tachycardic  The patient was seen in consultation by surgery  The patient did have an abscess of the left arm needing incision and drainage with open wound packing  The patient was put on IV antibiotics    The patient also had a leukocytosis of 15,000  Given that this is a 70-year-old male patient who failed outpatient antibiotics for an abscess coming in with a leukocytosis as well as tachycardia in need of incision and drainage and IV antibiotics he is inpatient appropriate  The patients vitals on arrival were ED Triage Vitals   Temperature Pulse Respirations Blood Pressure SpO2   07/16/19 0431 07/16/19 0431 07/16/19 0431 07/16/19 0431 07/16/19 0431   97 7 °F (36 5 °C) (!) 111 18 127/82 95 %      Temp Source Heart Rate Source Patient Position - Orthostatic VS BP Location FiO2 (%)   07/16/19 0431 07/16/19 0431 07/16/19 0431 07/16/19 0431 --   Oral Monitor Sitting Right arm       Pain Score       07/16/19 0509       Worst Possible Pain           History reviewed  No pertinent past medical history    Past Surgical History:   Procedure Laterality Date    INCISION AND DRAINAGE OF WOUND Left 7/16/2019    Procedure: INCISION AND DRAINAGE (I&D) EXTREMITY;  Surgeon: Zahra Harrison MD;  Location: [facility];  Service: General    NEPHROSTOMY      since removed    PARATHYROID GLAND SURGERY      TUMOR REMOVAL      parathyroid gland           Consults have been placed to:   IP CONSULT TO PHARMACY  IP CONSULT TO ACUTE CARE SURGERY  IP CONSULT TO ORTHOPEDIC SURGERY    Vitals:    07/17/19 1536 07/17/19 1537 07/17/19 1538 07/17/19 1539   BP:  126/71 126/71    BP Location:       Pulse: 80 68 67 67   Resp:  18     Temp:  98 1 °F (36 7 °C) 98 1 °F (36 7 °C)    TempSrc:       SpO2: 99% 98% 98% 98%   Weight:       Height:           Most recent labs:    Recent Labs     07/17/19  0543   WBC 8 04   HGB 14 4   HCT 42 0      K 4 0   CALCIUM 9 0   BUN 9   CREATININE 0 60   INR 1 14   *   *   ALKPHOS 95       Scheduled Meds:  Current Facility-Administered Medications:  cefepime 2,000 mg Intravenous Q8H Shane Costello PA-C Last Rate: 2,000 mg (07/17/19 1219)   ketorolac 15 mg Intravenous Q6H PRN Shane Costello PA-C    multi-electrolyte 1,000 mL Intravenous Once Carolin Bui Last Rate: Stopped (07/16/19 0636)   ondansetron 4 mg Intravenous Q6H PRN Magy Mckeon PA-C    oxyCODONE-acetaminophen 1 tablet Oral Q4H PRN Magy Mckeon PA-C    sodium chloride (PF) 3 mL Intravenous PRN Magy Mckeon PA-C    vancomycin 1,250 mg Intravenous Q6H Anthony Dyson      Continuous Infusions:   PRN Meds: ketorolac    ondansetron    oxyCODONE-acetaminophen    sodium chloride (PF)    Surgical procedures (if appropriate):  Procedure(s):  INCISION AND DRAINAGE (I&D) EXTREMITY

## 2019-07-17 NOTE — SOCIAL WORK
Discussed same with Dr Wilner Woods  Cultures still pending to determine if pt needs long term IV antibiotics  If pt does he will be a difficult placement for same due to history of IV drug use  CM to follow

## 2019-07-17 NOTE — PROGRESS NOTES
Progress note  Yanet Cespedes 1990, 34 y o  male MRN: [id number]    Unit/Bed#: -01 Encounter: [id number]    Primary Care Provider: No primary care provider on file  Date and time admitted to hospital: 7/16/2019  4:26 AM    Abscess of elbow  Assessment & Plan  Status post I&D  Continues to make substantial improvement on broad-spectrum antibiotic  Cultures no growth today  Will continue all current care until cultures finalize         Sepsis ([facility] [address] )  Assessment & Plan  · POA   · Evidenced by tachycardia, leukocytosis, presence of large abscess   · Started on cefepime and vanco, c/w abx  · Pharmacy consulted for vanco   · F/u with blood cultures      IVDU (intravenous drug user)  Assessment & Plan  · Patient is with h/o IVDA  · He states that that he has been clean x 8 months and has not been injecting   · Cefepime and vanco started in the ED , I agree, will c/w abx   · F/u w/ blood cultures   · Elevatted liver enzymes-check acute hepatitis panel         Transaminitis  Assessment & Plan  · Check trend liver enzymes  · Check hepatitis panel   · No TTP in abdomen  · Consider getting liver ultrasound      VTE Prophylaxis: pt is low risk   / sequential compression device   Code Status: full code   POLST: POLST is not applicable to this patient  Discussion with family:     Anticipated Length of Stay:  Patient will be admitted on an Inpatient basis with an anticipated length of stay of   2 midnights  Justification for Hospital Stay:         Past Medical and Surgical History:     History reviewed  No pertinent past medical history      Past Surgical History:   Procedure Laterality Date    INCISION AND DRAINAGE OF WOUND Left 7/16/2019    Procedure: INCISION AND DRAINAGE (I&D) EXTREMITY;  Surgeon: Zahra Harrison MD;  Location: [facility]IN [facility];  Service: General    NEPHROSTOMY      since removed    PARATHYROID GLAND SURGERY      TUMOR REMOVAL      parathyroid gland       Meds/Allergies:    Prior to Admission medications    Medication Sig Start Date End Date Taking? Authorizing Provider   neomycin-polymyxin-hydrocortisone (CORTISPORIN) 0 35%-10,000 units/mL-1% otic suspension Administer 4 drops into ears every 6 (six) hours for 7 days 10/13/18 10/20/18  Juan C Courtney PA-C     none     Allergies: No Known Allergies    Social History:     Marital Status: Single     Substance Use History:   Social History     Substance and Sexual Activity   Alcohol Use Never    Frequency: Never    Comment: occ     Social History     Tobacco Use   Smoking Status Former Smoker    Packs/day: 0 50    Types: Cigarettes   Smokeless Tobacco Never Used     Social History     Substance and Sexual Activity   Drug Use Never       Family History:    History reviewed  No pertinent family history  Physical Exam:     Vitals:   Blood Pressure: 126/71 (07/17/19 1538)  Pulse: 67 (07/17/19 1539)  Temperature: 98 1 °F (36 7 °C) (07/17/19 1538)  Temp Source: Oral (07/17/19 0250)  Respirations: 18 (07/17/19 1537)  Height: 5' 9" (175 3 cm) (07/16/19 0431)  Weight - Scale: 67 6 kg (149 lb 0 5 oz) (07/16/19 0431)  SpO2: 98 % (07/17/19 1539)      Constitutional:  Well developed, well nourished, no acute distress, non-toxic appearance   Eyes:  PERRL, conjunctiva normal   HENT:  Atraumatic, external ears normal, nose normal, oropharynx moist, no pharyngeal exudates  Neck- normal range of motion, no tenderness, supple   Respiratory:  No respiratory distress, normal breath sounds, no rales, no wheezing   Cardiovascular:  Normal rate, normal rhythm, no murmurs, no gallops, no rubs   GI:  Soft, nondistended, normal bowel sounds, nontender, no organomegaly, no mass, no rebound, no guarding   :  No costovertebral angle tenderness   Musculoskeletal:  No edema, no tenderness, no deformities  Back- no tenderness  Integument:  Well hydrated, no rash , right elbow dressing intact with no discharge swelling has decreased substantially    Lymphatic:  No lymphadenopathy noted   Neurologic:  Alert & oriented x 3, CN 2-12 normal, normal motor function, normal sensory function, no focal deficits noted   Psychiatric:  Speech and behavior appropriate             Additional Data:     Lab Results: I have personally reviewed pertinent reports  Results from last 7 days   Lab Units 07/17/19  0543   WBC Thousand/uL 8 04   HEMOGLOBIN g/dL 14 4   HEMATOCRIT % 42 0   PLATELETS Thousands/uL 177   NEUTROS PCT % 57   LYMPHS PCT % 30   MONOS PCT % 10   EOS PCT % 2     Results from last 7 days   Lab Units 07/17/19  0543   SODIUM mmol/L 136   POTASSIUM mmol/L 4 0   CHLORIDE mmol/L 102   CO2 mmol/L 26   BUN mg/dL 9   CREATININE mg/dL 0 60   ANION GAP mmol/L 8   CALCIUM mg/dL 9 0   ALBUMIN g/dL 3 1*   TOTAL BILIRUBIN mg/dL 0 90   ALK PHOS U/L 95   ALT U/L 301*   AST U/L 111*   GLUCOSE RANDOM mg/dL 96     Results from last 7 days   Lab Units 07/17/19  0543   INR  1 14             Results from last 7 days   Lab Units 07/16/19  0457   LACTIC ACID mmol/L 1 1       Imaging: I have personally reviewed pertinent reports  CT elbow left w contrast   Final Result by Karina Hamlin MD (07/16 2212)      Cellulitis distal lateral upper arm extending to the posterolateral proximal forearm  4 5 x 3 5 x 3 cm distal lateral upper arm subcutaneous abscess with subfascial extension into the underlying proximal brachioradialis muscle  The study was marked in [facility] for immediate notification  Workstation performed: [id number]             EKG, Pathology, and Other Studies Reviewed on Admission:   · EKG:     Allscripts / Epic Records Reviewed: Yes     ** Please Note: This note has been constructed using a voice recognition system   **

## 2019-07-17 NOTE — PLAN OF CARE
Problem: PAIN - ADULT  Goal: Verbalizes/displays adequate comfort level or baseline comfort level  Description  Interventions:  - Encourage patient to monitor pain and request assistance  - Assess pain using appropriate pain scale  - Administer analgesics based on type and severity of pain and evaluate response  - Implement non-pharmacological measures as appropriate and evaluate response  - Consider cultural and social influences on pain and pain management  - Notify physician/advanced practitioner if interventions unsuccessful or patient reports new pain  Outcome: Progressing     Problem: INFECTION - ADULT  Goal: Absence or prevention of progression during hospitalization  Description  INTERVENTIONS:  - Assess and monitor for signs and symptoms of infection  - Monitor lab/diagnostic results  - Monitor all insertion sites, i e  indwelling lines, tubes, and drains  - Monitor endotracheal (as able) and nasal secretions for changes in amount and color  - [place] appropriate cooling/warming therapies per order  - Administer medications as ordered  - Instruct and encourage patient and family to use good hand hygiene technique  - Identify and instruct in appropriate isolation precautions for identified infection/condition  Outcome: Progressing     Problem: DISCHARGE PLANNING  Goal: Discharge to home or other facility with appropriate resources  Description  INTERVENTIONS:  - Identify barriers to discharge w/patient and caregiver  - Arrange for needed discharge resources and transportation as appropriate  - Identify discharge learning needs (meds, wound care, etc )  - Arrange for interpretive services to assist at discharge as needed  - Refer to Case Management Department for coordinating discharge planning if the patient needs post-hospital services based on physician/advanced practitioner order or complex needs related to functional status, cognitive ability, or social support system  Outcome: Progressing Problem: Knowledge Deficit  Goal: Patient/family/caregiver demonstrates understanding of disease process, treatment plan, medications, and discharge instructions  Description  Complete learning assessment and assess knowledge base    Interventions:  - Provide teaching at level of understanding  - Provide teaching via preferred learning methods  Outcome: Progressing

## 2019-07-18 LAB
ANION GAP SERPL CALCULATED.3IONS-SCNC: 9 MMOL/L (ref 4–13)
BACTERIA SPEC ANAEROBE CULT: NORMAL
BUN SERPL-MCNC: 12 MG/DL (ref 5–25)
CALCIUM SERPL-MCNC: 9.2 MG/DL (ref 8.3–10.1)
CHLORIDE SERPL-SCNC: 104 MMOL/L (ref 100–108)
CO2 SERPL-SCNC: 26 MMOL/L (ref 21–32)
CREAT SERPL-MCNC: 0.64 MG/DL (ref 0.6–1.3)
GFR SERPL CREATININE-BSD FRML MDRD: 132 ML/MIN/1.73SQ M
GLUCOSE SERPL-MCNC: 92 MG/DL (ref 65–140)
POTASSIUM SERPL-SCNC: 4.1 MMOL/L (ref 3.5–5.3)
SODIUM SERPL-SCNC: 139 MMOL/L (ref 136–145)
VANCOMYCIN TROUGH SERPL-MCNC: 14.4 UG/ML (ref 10–20)

## 2019-07-18 PROCEDURE — 99233 SBSQ HOSP IP/OBS HIGH 50: CPT | Performed by: INTERNAL MEDICINE

## 2019-07-18 PROCEDURE — 80202 ASSAY OF VANCOMYCIN: CPT | Performed by: GENERAL PRACTICE

## 2019-07-18 PROCEDURE — 80048 BASIC METABOLIC PNL TOTAL CA: CPT | Performed by: INTERNAL MEDICINE

## 2019-07-18 PROCEDURE — 99024 POSTOP FOLLOW-UP VISIT: CPT | Performed by: SURGERY

## 2019-07-18 RX ADMIN — OXYCODONE HYDROCHLORIDE AND ACETAMINOPHEN 1 TABLET: 5; 325 TABLET ORAL at 23:38

## 2019-07-18 RX ADMIN — OXYCODONE HYDROCHLORIDE AND ACETAMINOPHEN 1 TABLET: 5; 325 TABLET ORAL at 05:22

## 2019-07-18 RX ADMIN — CEFEPIME HYDROCHLORIDE 2000 MG: 2 INJECTION, POWDER, FOR SOLUTION INTRAVENOUS at 12:21

## 2019-07-18 RX ADMIN — CEFEPIME HYDROCHLORIDE 2000 MG: 2 INJECTION, POWDER, FOR SOLUTION INTRAVENOUS at 21:24

## 2019-07-18 RX ADMIN — VANCOMYCIN HYDROCHLORIDE 1250 MG: 5 INJECTION, POWDER, LYOPHILIZED, FOR SOLUTION INTRAVENOUS at 05:57

## 2019-07-18 RX ADMIN — VANCOMYCIN HYDROCHLORIDE 1500 MG: 1 INJECTION, POWDER, LYOPHILIZED, FOR SOLUTION INTRAVENOUS at 19:30

## 2019-07-18 RX ADMIN — CEFEPIME HYDROCHLORIDE 2000 MG: 2 INJECTION, POWDER, FOR SOLUTION INTRAVENOUS at 05:17

## 2019-07-18 RX ADMIN — OXYCODONE HYDROCHLORIDE AND ACETAMINOPHEN 1 TABLET: 5; 325 TABLET ORAL at 13:53

## 2019-07-18 RX ADMIN — OXYCODONE HYDROCHLORIDE AND ACETAMINOPHEN 1 TABLET: 5; 325 TABLET ORAL at 09:16

## 2019-07-18 RX ADMIN — VANCOMYCIN HYDROCHLORIDE 1250 MG: 5 INJECTION, POWDER, LYOPHILIZED, FOR SOLUTION INTRAVENOUS at 13:21

## 2019-07-18 RX ADMIN — OXYCODONE HYDROCHLORIDE AND ACETAMINOPHEN 1 TABLET: 5; 325 TABLET ORAL at 19:34

## 2019-07-18 NOTE — PROGRESS NOTES
Vancomycin Assessment    Ruel Son is a 34 y o  male who is currently receiving vancomycin 1250 mg IV q6h for skin-soft tissue infection     Relevant clinical data and objective history reviewed:  Creatinine   Date Value Ref Range Status   07/18/2019 0 64 0 60 - 1 30 mg/dL Final     Comment:     Standardized to IDMS reference method   07/17/2019 0 60 0 60 - 1 30 mg/dL Final     Comment:     Standardized to IDMS reference method   07/16/2019 0 72 0 60 - 1 30 mg/dL Final     Comment:     Standardized to IDMS reference method     /64   Pulse 65   Temp 98 2 °F (36 8 °C)   Resp 18   Ht 5' 9" (1 753 m)   Wt 67 6 kg (149 lb 0 5 oz)   SpO2 98%   BMI 22 01 kg/m²   I/O last 3 completed shifts: In: 520 [P O :520]  Out: -   Lab Results   Component Value Date/Time    BUN 12 07/18/2019 05:14 AM    WBC 8 04 07/17/2019 05:43 AM    HGB 14 4 07/17/2019 05:43 AM    HCT 42 0 07/17/2019 05:43 AM    MCV 87 07/17/2019 05:43 AM     07/17/2019 05:43 AM     Temp Readings from Last 3 Encounters:   07/18/19 98 2 °F (36 8 °C)   04/29/19 (!) 100 8 °F (38 2 °C) (Oral)   10/13/18 98 °F (36 7 °C) (Oral)     Vancomycin Days of Therapy: 3    Assessment/Plan  The patient is currently on vancomycin utilizing scheduled dosing  The patient is receiving 1250 mg IV q6h with the most recent vancomycin level being at steady-state and sub-therapeutic based on a goal of 15-20 (appropriate for most indications) ; therefore, after clinical evaluation will be changed to 1500 mg IV q6h   Pharmacy will continue to follow closely for s/sx of nephrotoxicity, infusion reactions and appropriateness of therapy  BMP and CBC will be ordered per protocol  Plan for trough as patient approaches steady state, prior to the 4th  dose at approximately 1500 tomorrow 7-19-19  Pharmacy will continue to follow the patients culture results and clinical progress daily      Yevgeniy Christiansen, Pharmacist

## 2019-07-18 NOTE — PROGRESS NOTES
[facility] Internal Medicine Progress note  Roahn Hayden 1990, 34 y o  male MRN: [id number]    Unit/Bed#: -01 Encounter: [id number]    Primary Care Provider: No primary care provider on file  Date and time admitted to hospital: 7/16/2019  4:26 AM    Abscess of elbow  Assessment & Plan  Status post I&D  Continues to make substantial improvement on broad-spectrum antibiotic  Cultures no growth today  Will continue all current care until cultures finalize         Sepsis ([facility] [address] )  Assessment & Plan  · POA   · Evidenced by tachycardia, leukocytosis, presence of large abscess   · Started on cefepime and vanco, c/w abx  · Pharmacy consulted for vanco   · F/u with blood cultures      IVDU (intravenous drug user)  Assessment & Plan  · Patient is with h/o IVDA  · He states that that he has been clean x 8 months and has not been injecting   · Cefepime and vanco started in the ED , I agree, will c/w abx   · F/u w/ blood cultures   · Elevatted liver enzymes-check acute hepatitis panel         Transaminitis  Assessment & Plan  · Check trend liver enzymes  · Check hepatitis panel   · No TTP in abdomen  · Consider getting liver ultrasound      VTE Prophylaxis: pt is low risk   / sequential compression device   Code Status: full code   POLST: POLST is not applicable to this patient  Discussion with family:     Anticipated Length of Stay:  Patient will be admitted on an Inpatient basis with an anticipated length of stay of   2 midnights  Justification for Hospital Stay:         Past Medical and Surgical History:     History reviewed  No pertinent past medical history      Past Surgical History:   Procedure Laterality Date    INCISION AND DRAINAGE OF WOUND Left 7/16/2019    Procedure: INCISION AND DRAINAGE (I&D) EXTREMITY;  Surgeon: Marlo Coronado MD;  Location: [facility];  Service: General    NEPHROSTOMY      since removed    PARATHYROID GLAND SURGERY      TUMOR REMOVAL      parathyroid gland Meds/Allergies:    Prior to Admission medications    Medication Sig Start Date End Date Taking? Authorizing Provider   neomycin-polymyxin-hydrocortisone (CORTISPORIN) 0 35%-10,000 units/mL-1% otic suspension Administer 4 drops into ears every 6 (six) hours for 7 days 10/13/18 10/20/18  Chris Doran PA-C     none     Allergies: No Known Allergies    Social History:     Marital Status: Single     Substance Use History:   Social History     Substance and Sexual Activity   Alcohol Use Never    Frequency: Never    Comment: occ     Social History     Tobacco Use   Smoking Status Former Smoker    Packs/day: 0 50    Types: Cigarettes   Smokeless Tobacco Never Used     Social History     Substance and Sexual Activity   Drug Use Never       Family History:    History reviewed  No pertinent family history  Physical Exam:     Vitals:   Blood Pressure: 120/68 (07/18/19 1500)  Pulse: 78 (07/18/19 1500)  Temperature: 98 1 °F (36 7 °C) (07/18/19 0720)  Temp Source: Oral (07/18/19 0720)  Respirations: 16 (07/18/19 0720)  Height: 5' 9" (175 3 cm) (07/16/19 0431)  Weight - Scale: 67 6 kg (149 lb 0 5 oz) (07/16/19 0431)  SpO2: 98 % (07/18/19 1500)      Constitutional:  Well developed, well nourished, no acute distress, non-toxic appearance   Eyes:  PERRL, conjunctiva normal   HENT:  Atraumatic, external ears normal, nose normal, oropharynx moist, no pharyngeal exudates  Neck- normal range of motion, no tenderness, supple   Respiratory:  No respiratory distress, normal breath sounds, no rales, no wheezing   Cardiovascular:  Normal rate, normal rhythm, no murmurs, no gallops, no rubs   GI:  Soft, nondistended, normal bowel sounds, nontender, no organomegaly, no mass, no rebound, no guarding   :  No costovertebral angle tenderness   Musculoskeletal:  No edema, no tenderness, no deformities   Back- no tenderness  Integument:  Well hydrated, no rash , right elbow dressing intact with no discharge swelling has decreased substantially  Lymphatic:  No lymphadenopathy noted   Neurologic:  Alert & oriented x 3, CN 2-12 normal, normal motor function, normal sensory function, no focal deficits noted   Psychiatric:  Speech and behavior appropriate             Additional Data:     Lab Results: I have personally reviewed pertinent reports  Results from last 7 days   Lab Units 07/17/19  0543   WBC Thousand/uL 8 04   HEMOGLOBIN g/dL 14 4   HEMATOCRIT % 42 0   PLATELETS Thousands/uL 177   NEUTROS PCT % 57   LYMPHS PCT % 30   MONOS PCT % 10   EOS PCT % 2     Results from last 7 days   Lab Units 07/18/19  0514 07/17/19  0543   SODIUM mmol/L 139 136   POTASSIUM mmol/L 4 1 4 0   CHLORIDE mmol/L 104 102   CO2 mmol/L 26 26   BUN mg/dL 12 9   CREATININE mg/dL 0 64 0 60   ANION GAP mmol/L 9 8   CALCIUM mg/dL 9 2 9 0   ALBUMIN g/dL  --  3 1*   TOTAL BILIRUBIN mg/dL  --  0 90   ALK PHOS U/L  --  95   ALT U/L  --  301*   AST U/L  --  111*   GLUCOSE RANDOM mg/dL 92 96     Results from last 7 days   Lab Units 07/17/19  0543   INR  1 14             Results from last 7 days   Lab Units 07/16/19  0457   LACTIC ACID mmol/L 1 1       Imaging: I have personally reviewed pertinent reports  CT elbow left w contrast   Final Result by Antonia Sever, MD (07/16 9044)      Cellulitis distal lateral upper arm extending to the posterolateral proximal forearm  4 5 x 3 5 x 3 cm distal lateral upper arm subcutaneous abscess with subfascial extension into the underlying proximal brachioradialis muscle  The study was marked in [facility] for immediate notification  Workstation performed: [id number]             EKG, Pathology, and Other Studies Reviewed on Admission:   · EKG:     Allscripts / Epic Records Reviewed: Yes     ** Please Note: This note has been constructed using a voice recognition system   **

## 2019-07-18 NOTE — PROGRESS NOTES
Progress Note - General Surgery   Sandra Ramirez 34 y o  male MRN: [id number]  Unit/Bed#: -01 Encounter: [id number]    Assessment/Plan  Cellulitis and abscess of left arm, packing removed  Cultures +alpha hem strep to date   -ok for surgical discharge  -cont to do wet to dry dressing daily  -recommend discharge on total of 10 days of Augmentin  -f/u in surgery office in 2 weeks  -ok to shower and get wet then apply dressings  Chief Complaint: I took the packing out today  Objective Directed Exam:  Ambulating  Left arm with full function  S/p I & D abscess, improving  Blood pressure 111/64, pulse 65, temperature 98 2 °F (36 8 °C), resp  rate 18, height 5' 9" (1 753 m), weight 67 6 kg (149 lb 0 5 oz), SpO2 98 %  ,Body mass index is 22 01 kg/m²        Intake/Output Summary (Last 24 hours) at 7/18/2019 1220  Last data filed at 7/17/2019 1953  Gross per 24 hour   Intake 520 ml   Output    Net 520 ml       Invasive Devices     Peripheral Intravenous Line            Peripheral IV 07/16/19 Right Antecubital 2 days                Physical Exam:   General Appearance:    Alert and orientated x 3, cooperative, no distress   Lungs:     Clear to auscultation bilaterally, respirations unlabored    Heart:    Regular rate and rhythm   Abdomen:     As above          Extremities:   Extremities normal,  no cyanosis or edema   Pulses:   2+ and symmetric all extremities, no calf tenderness   Skin:   Skin color, texture, turgor normal, no rashes or lesions   Neurologic:   CNII-XII intact, normal strength, sensation and reflexes     Throughout, affect appropriate                           Labs:   CBC with diff:   Lab Results   Component Value Date    WBC 8 04 07/17/2019    HGB 14 4 07/17/2019    HCT 42 0 07/17/2019    MCV 87 07/17/2019     07/17/2019    MCH 29 8 07/17/2019    MCHC 34 3 07/17/2019    RDW 13 7 07/17/2019    MPV 10 5 07/17/2019    NRBC 0 07/17/2019   ,   BMP/CMP:  Lab Results   Component Value Date    K 4 1 07/18/2019     07/18/2019    CO2 26 07/18/2019    CO2 26 11/11/2017    BUN 12 07/18/2019    CREATININE 0 64 07/18/2019    GLUCOSE 84 11/11/2017    CALCIUM 9 2 07/18/2019     (H) 07/17/2019     (H) 07/17/2019    ALKPHOS 95 07/17/2019    EGFR 132 07/18/2019    EGFR 129 11/11/2017   ,   Lipid Panel: No results found for: CHOL,   Coags:   Lab Results   Component Value Date    PTT 31 07/17/2019    INR 1 14 07/17/2019   ,     Blood Culture:   Lab Results   Component Value Date    BLOODCX No Growth at 48 hrs  07/16/2019   ,   Urinalysis: No results found for: Rubbie Bjornstad, SPECGRAV, PHUR, LEUKOCYTESUR, NITRITE, PROTEINUA, GLUCOSEU, KETONESU, BILIRUBINUR, BLOODU,   Urine Culture: No results found for: URINECX,   Wound Culure:   Lab Results   Component Value Date    WOUNDCULT 2+ Growth of Alpha Hemolytic Streptococcus (A) 07/16/2019         Imaging: Ct Elbow Left W Contrast    Result Date: 7/16/2019  Impression: Cellulitis distal lateral upper arm extending to the posterolateral proximal forearm  4 5 x 3 5 x 3 cm distal lateral upper arm subcutaneous abscess with subfascial extension into the underlying proximal brachioradialis muscle  The study was marked in [facility] for immediate notification   Workstation performed: [id number]         Maame Vazquez PA-C  7/18/2019

## 2019-07-18 NOTE — PLAN OF CARE
Problem: PAIN - ADULT  Goal: Verbalizes/displays adequate comfort level or baseline comfort level  Description  Interventions:  - Encourage patient to monitor pain and request assistance  - Assess pain using appropriate pain scale  - Administer analgesics based on type and severity of pain and evaluate response  - Implement non-pharmacological measures as appropriate and evaluate response  - Consider cultural and social influences on pain and pain management  - Notify physician/advanced practitioner if interventions unsuccessful or patient reports new pain  Outcome: Progressing     Problem: INFECTION - ADULT  Goal: Absence or prevention of progression during hospitalization  Description  INTERVENTIONS:  - Assess and monitor for signs and symptoms of infection  - Monitor lab/diagnostic results  - Monitor all insertion sites, i e  indwelling lines, tubes, and drains  - Monitor endotracheal (as able) and nasal secretions for changes in amount and color  - [place] appropriate cooling/warming therapies per order  - Administer medications as ordered  - Instruct and encourage patient and family to use good hand hygiene technique  - Identify and instruct in appropriate isolation precautions for identified infection/condition  Outcome: Progressing     Problem: DISCHARGE PLANNING  Goal: Discharge to home or other facility with appropriate resources  Description  INTERVENTIONS:  - Identify barriers to discharge w/patient and caregiver  - Arrange for needed discharge resources and transportation as appropriate  - Identify discharge learning needs (meds, wound care, etc )  - Arrange for interpretive services to assist at discharge as needed  - Refer to Case Management Department for coordinating discharge planning if the patient needs post-hospital services based on physician/advanced practitioner order or complex needs related to functional status, cognitive ability, or social support system  Outcome: Progressing Problem: Knowledge Deficit  Goal: Patient/family/caregiver demonstrates understanding of disease process, treatment plan, medications, and discharge instructions  Description  Complete learning assessment and assess knowledge base    Interventions:  - Provide teaching at level of understanding  - Provide teaching via preferred learning methods  Outcome: Progressing

## 2019-07-19 VITALS
BODY MASS INDEX: 22.07 KG/M2 | DIASTOLIC BLOOD PRESSURE: 71 MMHG | HEART RATE: 87 BPM | SYSTOLIC BLOOD PRESSURE: 126 MMHG | WEIGHT: 149.03 LBS | OXYGEN SATURATION: 99 % | TEMPERATURE: 97.9 F | RESPIRATION RATE: 18 BRPM | HEIGHT: 69 IN

## 2019-07-19 LAB
ANION GAP SERPL CALCULATED.3IONS-SCNC: 10 MMOL/L (ref 4–13)
BACTERIA WND AEROBE CULT: ABNORMAL
BASOPHILS # BLD AUTO: 0.04 THOUSANDS/ΜL (ref 0–0.1)
BASOPHILS NFR BLD AUTO: 1 % (ref 0–1)
BUN SERPL-MCNC: 11 MG/DL (ref 5–25)
CALCIUM SERPL-MCNC: 9.3 MG/DL (ref 8.3–10.1)
CHLORIDE SERPL-SCNC: 103 MMOL/L (ref 100–108)
CO2 SERPL-SCNC: 24 MMOL/L (ref 21–32)
CREAT SERPL-MCNC: 0.55 MG/DL (ref 0.6–1.3)
EOSINOPHIL # BLD AUTO: 0.27 THOUSAND/ΜL (ref 0–0.61)
EOSINOPHIL NFR BLD AUTO: 4 % (ref 0–6)
ERYTHROCYTE [DISTWIDTH] IN BLOOD BY AUTOMATED COUNT: 13.6 % (ref 11.6–15.1)
GFR SERPL CREATININE-BSD FRML MDRD: 141 ML/MIN/1.73SQ M
GLUCOSE SERPL-MCNC: 94 MG/DL (ref 65–140)
GRAM STN SPEC: ABNORMAL
GRAM STN SPEC: ABNORMAL
HCT VFR BLD AUTO: 41.9 % (ref 36.5–49.3)
HGB BLD-MCNC: 14.3 G/DL (ref 12–17)
IMM GRANULOCYTES # BLD AUTO: 0.04 THOUSAND/UL (ref 0–0.2)
IMM GRANULOCYTES NFR BLD AUTO: 1 % (ref 0–2)
LYMPHOCYTES # BLD AUTO: 2.08 THOUSANDS/ΜL (ref 0.6–4.47)
LYMPHOCYTES NFR BLD AUTO: 31 % (ref 14–44)
MCH RBC QN AUTO: 29.7 PG (ref 26.8–34.3)
MCHC RBC AUTO-ENTMCNC: 34.1 G/DL (ref 31.4–37.4)
MCV RBC AUTO: 87 FL (ref 82–98)
MONOCYTES # BLD AUTO: 0.63 THOUSAND/ΜL (ref 0.17–1.22)
MONOCYTES NFR BLD AUTO: 9 % (ref 4–12)
NEUTROPHILS # BLD AUTO: 3.77 THOUSANDS/ΜL (ref 1.85–7.62)
NEUTS SEG NFR BLD AUTO: 54 % (ref 43–75)
NRBC BLD AUTO-RTO: 0 /100 WBCS
PLATELET # BLD AUTO: 200 THOUSANDS/UL (ref 149–390)
PMV BLD AUTO: 10.7 FL (ref 8.9–12.7)
POTASSIUM SERPL-SCNC: 4 MMOL/L (ref 3.5–5.3)
RBC # BLD AUTO: 4.82 MILLION/UL (ref 3.88–5.62)
SODIUM SERPL-SCNC: 137 MMOL/L (ref 136–145)
WBC # BLD AUTO: 6.83 THOUSAND/UL (ref 4.31–10.16)

## 2019-07-19 PROCEDURE — 85025 COMPLETE CBC W/AUTO DIFF WBC: CPT | Performed by: INTERNAL MEDICINE

## 2019-07-19 PROCEDURE — 80048 BASIC METABOLIC PNL TOTAL CA: CPT | Performed by: INTERNAL MEDICINE

## 2019-07-19 PROCEDURE — 99239 HOSP IP/OBS DSCHRG MGMT >30: CPT | Performed by: INTERNAL MEDICINE

## 2019-07-19 RX ORDER — SENNOSIDES 8.6 MG
650 CAPSULE ORAL EVERY 8 HOURS PRN
Qty: 30 TABLET | Refills: 0 | Status: SHIPPED | OUTPATIENT
Start: 2019-07-19 | End: 2019-12-23 | Stop reason: HOSPADM

## 2019-07-19 RX ORDER — AMOXICILLIN AND CLAVULANATE POTASSIUM 875; 125 MG/1; MG/1
1 TABLET, FILM COATED ORAL EVERY 12 HOURS SCHEDULED
Qty: 16 TABLET | Refills: 0 | Status: SHIPPED | OUTPATIENT
Start: 2019-07-19 | End: 2019-07-27

## 2019-07-19 RX ADMIN — VANCOMYCIN HYDROCHLORIDE 1500 MG: 1 INJECTION, POWDER, LYOPHILIZED, FOR SOLUTION INTRAVENOUS at 06:21

## 2019-07-19 RX ADMIN — OXYCODONE HYDROCHLORIDE AND ACETAMINOPHEN 1 TABLET: 5; 325 TABLET ORAL at 10:37

## 2019-07-19 RX ADMIN — VANCOMYCIN HYDROCHLORIDE 1500 MG: 1 INJECTION, POWDER, LYOPHILIZED, FOR SOLUTION INTRAVENOUS at 01:08

## 2019-07-19 RX ADMIN — CEFEPIME HYDROCHLORIDE 2000 MG: 2 INJECTION, POWDER, FOR SOLUTION INTRAVENOUS at 03:45

## 2019-07-19 RX ADMIN — OXYCODONE HYDROCHLORIDE AND ACETAMINOPHEN 1 TABLET: 5; 325 TABLET ORAL at 06:21

## 2019-07-19 NOTE — NURSING NOTE
Patient discharged to home in the care of family member  All discharge teaching completed  IV and wristbands removed  AVS printed and sent home  All  belongings sent with patient  Patient walked independently to [facility]  Observed patient discharged safely in stable condition

## 2019-07-19 NOTE — DISCHARGE INSTRUCTIONS
Continue antibiotics until they are finished  Remove the packing while in the shower  Ok to shower daily and get wet, rinse and pat dry  Apply wet to dry dressing daily, change more frequently if dressings saturate   Call the office if it gets more red, more painful, fever chills, nausea or vomiting  Follow up in the surgery office in 2 weeks  Abscess Incision and Drainage   WHAT YOU SHOULD KNOW:   An abscess is an area under the skin where pus collects  An abscess incision and drainage (I and D) is a procedure to drain the pus  An abscess is most commonly caused by bacteria and can occur anywhere on the body  INSTRUCTIONS:   Medicines:   · Pain medicine: You may be given medicine to take away or decrease pain  Do not wait until the pain is severe before you take your medicine  · Take your medicine as directed  Call your healthcare provider if you think your medicine is not helping or if you have side effects  Tell him if you are allergic to any medicine  Keep a list of the medicines, vitamins, and herbs you take  Include the amounts, and when and why you take them  Bring the list or the pill bottles to follow-up visits  Carry your medicine list with you in case of an emergency  Wound care:   · Bandage:  Do not remove your bandage unless your primary healthcare provider [facility]) says it is okay  Keep the bandage clean and dry  Remove your bandage and clean the wound once your PHP gives you directions  · Packing:  Ask your PHP how to pack and change the gauze in your wound  Keep track of how many gauze dressings are inside the wound when you remove the packing  Do not pack more gauze than directed into the wound  This can damage the tissue  · Warm soaks:  Soak your abscess in warm, clean water for 20 to 30 minutes every day, for 1 week  Ask your PHP when to start warm soaks  Wear a splint:  You may need a splint if the abscess is on your arm, hand, or leg   A splint limits movement and helps your wound heal  Do not remove the splint until your first follow-up visit or as directed  Elevate your wound: If your wound is on your arm or leg, keep it raised above the level of your heart as often as you can  This will help reduce swelling  Prop your arm or leg on 2 pillows  Follow up with your primary healthcare provider or surgeon in 1 to 3 days:  Write down your questions so you remember to ask them during your visits  You may need to have your packing removed or your bandage changed  Contact your primary healthcare provider or surgeon if:   · You have a fever or chills  · You feel more tired than usual      · Your abscess returns  · You have questions or concerns about your procedure  Return to the emergency department if:   · The area around your abscess has red streaks or is warm and painful  · You are bleeding from your wound and cannot stop it  · You have back or stomach pain  · Your muscles or joints ache  © 2014 [address] [employer] is for End User's use only and may not be sold, redistributed or otherwise used for commercial purposes  All illustrations and images included in CareNotes® are the copyrighted property of [employer]  or [employer]  The above information is an  only  It is not intended as medical advice for individual conditions or treatments  Talk to your doctor, nurse or pharmacist before following any medical regimen to see if it is safe and effective for you  Amoxicillin/Clavulanate Potassium (By mouth)   Amoxicillin (q-zbf-f-ALLIE-in), Clavulanate Potassium ([id number]-tl-pz-ricky rum-DBC-qb-um)  Treats infections  This medicine is a penicillin antibiotic  Brand Name(s): Augmentin, Augmentin ES-600, Augmentin XR   There may be other brand names for this medicine  When This Medicine Should Not Be Used: This medicine is not right for everyone   Do not use it if you had an allergic reaction to amoxicillin, clavulanate, or a similar antibiotic (penicillin or cephalosporin), or if you had liver problems caused by Augmentin®  How to Use This Medicine:   Liquid, Tablet, Chewable Tablet, Long Acting Tablet  · Your doctor will tell you how much medicine to use  Do not use more than directed  · Take this medicine with a snack or at the beginning of a meal to help prevent nausea  · Chewable tablets: Chew the tablet completely before you swallow it  · Measure the oral liquid medicine with a marked measuring spoon, oral syringe, or medicine cup  Shake the medicine well just before you measure each dose  Rinse the spoon or dropper after each use  · Swallow the extended-release tablet whole  Do not crush, break, or chew it  · Take all of the medicine in your prescription to clear up your infection, even if you feel better after the first few doses  · Missed dose: Take a dose as soon as you remember  If it is almost time for your next dose, wait until then and take a regular dose  Do not take extra medicine to make up for a missed dose  · Tablet, extended-release tablet, chewable tablet: Store at room temperature, away from heat, moisture, and direct light  · Oral liquid: Store in the refrigerator  Do not freeze  · Throw away any unused oral liquid after 10 days  Drugs and Foods to Avoid:   Ask your doctor or pharmacist before using any other medicine, including over-the-counter medicines, vitamins, and herbal products  · Some medicines can affect how this medicine works  Tell your doctor if you are taking a blood thinner (such as warfarin), allopurinol, or probenecid  Warnings While Using This Medicine:   · Tell your doctor if you are pregnant or breastfeeding, or if you have kidney disease, liver disease, or mononucleosis (mono)  · Birth control pills may not work as well while you are taking this medicine  Use another form of birth control to prevent pregnancy  · This medicine can cause diarrhea   Call your doctor if the diarrhea becomes severe, does not stop, or is bloody  Do not take any medicine to stop diarrhea until you have talked to your doctor  Diarrhea can occur 2 months or more after you stop taking this medicine  · Tell any doctor or dentist who treats you that you are using this medicine  This medicine may affect certain medical test results  · Call your doctor if your symptoms do not improve or if they get worse  · The chewable tablet and oral liquid contain phenylalanine  Talk to your doctor before you use this medicine if you have phenylketonuria (PKU)  · Keep all medicine out of the reach of children  Never share your medicine with anyone  Possible Side Effects While Using This Medicine:   Call your doctor right away if you notice any of these side effects:  · Allergic reaction: Itching or hives, swelling in your face or hands, swelling or tingling in your mouth or throat, chest tightness, trouble breathing  · Blistering, peeling, red skin rash  · Change in how much or how often you urinate  · Dark urine or pale stools, nausea, vomiting, loss of appetite, stomach pain, yellow eyes or skin  · Diarrhea that may contain blood, stomach cramps  If you notice these less serious side effects, talk with your doctor:   · Diaper rash  · Mild diarrhea, nausea, vomiting  · Tooth discoloration (in children)  If you notice other side effects that you think are caused by this medicine, tell your doctor  Call your doctor for medical advice about side effects  You may report side effects to FDA at 3-002-FDA-4293  © 2017 [address] [employer] Information is for End User's use only and may not be sold, redistributed or otherwise used for commercial purposes  The above information is an  only  It is not intended as medical advice for individual conditions or treatments   Talk to your doctor, nurse or pharmacist before following any medical regimen to see if it is safe and effective for you

## 2019-07-21 LAB
BACTERIA BLD CULT: NORMAL
BACTERIA BLD CULT: NORMAL

## 2019-08-10 NOTE — DISCHARGE SUMMARY
Discharge Summary - Sandra Ramirez 34 y o  male MRN: [id number]    Unit/Bed#: -01 Encounter: [id number]    Admission Date:   Admission Orders (From admission, onward)     Ordered        07/16/19 0707  Inpatient Admission  Once         07/16/19 0701  Inpatient Admission  Once                     Admitting Diagnosis: Abscess [L02 91]  Abscess of left arm [L02 414]  Sepsis ([facility] ) [A41 9]        Procedures Performed: No orders of the defined types were placed in this encounter  Summary of Hospital Course:     Sandra Ramirez is a 34 y o  male who presents with enlarged, swollen, tender, hard abscess located over the left elbow  Patient reports that symptoms started about 2 days ago and rapidly worsened  When he woke up in the middle of the night he was unable to extend his left elbow and was in the a lot of pain  Also complains of chills  He has a History of IV drug abuse but denies any recent drug abuse last 8 months  Patient states that something crawled in his bed and bit him  Denies any fever, shortness of breath, nausea, vomiting, numbness, tingling or chest pain  Patient was subsequently evaluated by General surgery and Orthopedic surgery and underwent I and D by General surgery  Appropriate cultures were obtained patient was admitted for IV antibiotic treatment which she tolerated very well  Patient had total 3 days of IV antibiotics at this time he has minimal swelling minimal redness and antibiotics were subsequently switched to p o  And patient discharged home with family  He was told to return to ED if chest pain shortness of breath fever chills increased swelling at incision site at the elbow any discharge or any other concerns he has expressed mario's standing      Significant Findings, Care, Treatment and Services Provided:  As per description    Complications:   None    Discharge Diagnosis:   Abscess [L02 91]  Abscess of left arm [L02 414]  Sepsis ([facility] ) [A41 9]    Resolved Problems Date Reviewed: 7/16/2019    None          Condition at Discharge: good         Discharge instructions/Information to patient and family:   See after visit summary for information provided to patient and family  Provisions for Follow-Up Care:  See after visit summary for information related to follow-up care and any pertinent home health orders  PCP: No primary care provider on file  Disposition: Home    Planned Readmission: No    Discharge Statement   I spent 40 minutes discharging the patient  This time was spent on the day of discharge  I had direct contact with the patient on the day of discharge  Additional documentation is required if more than 30 minutes were spent on discharge  Discharge Medications:  See after visit summary for reconciled discharge medications provided to patient and family

## 2019-09-25 ENCOUNTER — HOSPITAL ENCOUNTER (EMERGENCY)
Facility: HOSPITAL | Age: 29
Discharge: HOME | End: 2019-09-25
Attending: EMERGENCY MEDICINE

## 2019-09-25 VITALS
HEIGHT: 69 IN | TEMPERATURE: 98.3 F | RESPIRATION RATE: 20 BRPM | OXYGEN SATURATION: 98 % | BODY MASS INDEX: 20.9 KG/M2 | HEART RATE: 86 BPM | DIASTOLIC BLOOD PRESSURE: 82 MMHG | SYSTOLIC BLOOD PRESSURE: 138 MMHG | WEIGHT: 141.09 LBS

## 2019-09-25 DIAGNOSIS — L02.414 ABSCESS OF LEFT FOREARM: Primary | ICD-10-CM

## 2019-09-25 PROCEDURE — 99284 EMERGENCY DEPT VISIT MOD MDM: CPT | Performed by: EMERGENCY MEDICINE

## 2019-09-25 PROCEDURE — 10061 I&D ABSCESS COMP/MULTIPLE: CPT | Performed by: EMERGENCY MEDICINE

## 2019-09-25 PROCEDURE — 99282 EMERGENCY DEPT VISIT SF MDM: CPT

## 2019-09-25 RX ORDER — CEPHALEXIN 250 MG/1
500 CAPSULE ORAL ONCE
Status: COMPLETED | OUTPATIENT
Start: 2019-09-25 | End: 2019-09-25

## 2019-09-25 RX ORDER — CEPHALEXIN 500 MG/1
500 CAPSULE ORAL EVERY 6 HOURS SCHEDULED
Qty: 40 CAPSULE | Refills: 0 | Status: SHIPPED | OUTPATIENT
Start: 2019-09-25 | End: 2019-10-05

## 2019-09-25 RX ORDER — LIDOCAINE HYDROCHLORIDE 10 MG/ML
10 INJECTION, SOLUTION EPIDURAL; INFILTRATION; INTRACAUDAL; PERINEURAL ONCE
Status: COMPLETED | OUTPATIENT
Start: 2019-09-25 | End: 2019-09-25

## 2019-09-25 RX ADMIN — LIDOCAINE HYDROCHLORIDE 10 ML: 10 INJECTION, SOLUTION EPIDURAL; INFILTRATION; INTRACAUDAL; PERINEURAL at 15:28

## 2019-09-25 RX ADMIN — CEPHALEXIN 500 MG: 250 CAPSULE ORAL at 15:28

## 2019-09-25 NOTE — DISCHARGE INSTRUCTIONS
Keflex every 6 hours to fight infection  Return in 2 days for packing removal  Return sooner increasing redness swelling or fever

## 2019-09-25 NOTE — ED PROVIDER NOTES
History  Chief Complaint   Patient presents with    Abscess     Patient here for left arm abscess  Patient has redness and swelling  HPI patient is a 42-year-old male, reports over the last 2-3 days he has noticed swelling in his left forearm  Patient denies any puncture wounds  Denies injecting anything there  He reports that it just started to swell spontaneously  Patient was recently admitted July 16th for an infection in the form which required incision and drainage  Patient denies any new trauma  He reports that that time he had some fever and chills and he waited longer this time he came to the hospital earlier because he knew that he might progress with an infection  Patient denied IV drug use here and denied IV drug use at the site of the previous infection  CT at that time showed no bony involvement primarily subcutaneous infection which required incision drainage in the operating room  Past medical history prior soft tissue infection left arm  Family history noncontributory  Social history, former smoker, former IV drug use    Prior to Admission Medications   Prescriptions Last Dose Informant Patient Reported? Taking?   acetaminophen (TYLENOL) 650 mg CR tablet   No No   Sig: Take 1 tablet (650 mg total) by mouth every 8 (eight) hours as needed for mild pain   neomycin-polymyxin-hydrocortisone (CORTISPORIN) 0 35%-10,000 units/mL-1% otic suspension   No No   Sig: Administer 4 drops into ears every 6 (six) hours for 7 days      Facility-Administered Medications: None       History reviewed  No pertinent past medical history  Past Surgical History:   Procedure Laterality Date    INCISION AND DRAINAGE OF WOUND Left 7/16/2019    Procedure: INCISION AND DRAINAGE (I&D) EXTREMITY;  Surgeon: Hola Franco MD;  Location: [facility];  Service: General    NEPHROSTOMY      since removed    PARATHYROID GLAND SURGERY      TUMOR REMOVAL      parathyroid gland       History reviewed   No pertinent family history  I have reviewed and agree with the history as documented  Social History     Tobacco Use    Smoking status: Former Smoker     Packs/day: 0 50     Types: Cigarettes    Smokeless tobacco: Former User   Substance Use Topics    Alcohol use: Never     Frequency: Never     Comment: occ    Drug use: Never        Review of Systems   Constitutional: Negative for chills and fever  HENT: Negative for congestion  Eyes: Negative for pain and redness  Respiratory: Negative for cough and shortness of breath  Cardiovascular: Negative for chest pain  Gastrointestinal: Negative for abdominal pain and vomiting  swelling left forearm    Physical Exam  Physical Exam   Constitutional: He is oriented to person, place, and time  He appears well-developed and well-nourished  HENT:   Head: Normocephalic  Right Ear: External ear normal    Left Ear: External ear normal    Nose: Nose normal    Mouth/Throat: Oropharynx is clear and moist    Eyes: Pupils are equal, round, and reactive to light  EOM and lids are normal    Neck: Normal range of motion  Neck supple  Pulmonary/Chest: Effort normal  No respiratory distress  Musculoskeletal: Normal range of motion  He exhibits no deformity  Neurological: He is alert and oriented to person, place, and time  Skin: Skin is warm and dry  Approximately 3 x 4 cm fullness in the left forearm below the left elbow consistent with a localized abscess  Abscess is on the dorsal surface of the left forearm patient has full range of motion of her his left wrist and normal function  Normal sensation over his fingers  Good capillary refill in all the fingers  Normal 2 point touch  Psychiatric: He has a normal mood and affect  Nursing note and vitals reviewed        Vital Signs  ED Triage Vitals [09/25/19 1503]   Temperature Pulse Respirations Blood Pressure SpO2   98 3 °F (36 8 °C) 86 20 138/82 98 %      Temp Source Heart Rate Source Patient Position - Orthostatic VS BP Location FiO2 (%)   Oral Monitor Sitting Left arm --      Pain Score       --           Vitals:    09/25/19 1503   BP: 138/82   Pulse: 86   Patient Position - Orthostatic VS: Sitting         Visual Acuity      ED Medications  Medications   lidocaine (PF) (XYLOCAINE-MPF) 1 % injection 10 mL (10 mL Infiltration Given 9/25/19 1528)   cephalexin (KEFLEX) capsule 500 mg (500 mg Oral Given 9/25/19 1528)       Diagnostic Studies  Results Reviewed     None                 No orders to display              Procedures  Incision and drain  Date/Time: 9/25/2019 3:31 PM  Performed by: Omid Dodge MD  Authorized by: Omid Dodge MD     Patient location:  ED  Consent:     Consent obtained:  Verbal    Consent given by:  Patient    Risks discussed:  Bleeding and incomplete drainage  Universal protocol:     Patient identity confirmed:  Verbally with patient  Location:     Type:  Abscess    Location:  Upper extremity    Upper extremity location:  L arm  Pre-procedure details:     Skin preparation:  Betadine  Anesthesia (see MAR for exact dosages): Anesthesia method:  Local infiltration    Local anesthetic:  Lidocaine 1% w/o epi  Procedure details:     Complexity:  Intermediate    Incision types:  Stab incision    Scalpel blade:  11    Approach:  Open    Incision depth:  Subcutaneous    Wound management:  Probed and deloculated    Irrigation with saline:  50    Drainage:  Purulent    Drainage amount:  Copious    Wound treatment:  Wound left open    Packing materials:  1/2 in gauze    Amount 1/2":  3  Post-procedure details:     Patient tolerance of procedure: Tolerated well, no immediate complications           ED Course                               MDM medical decision making 49-year-old male presents emergency department with an abscess in his left forearm, denies injection there  History of prior abscess in this arm  Discussed with patient advised incision and drainage he agreed    Wound was opened and there was a large amount of pus greater than 20 cc removed  Area was packed  Discussed outpatient treatment and follow-up with antibiotics  Discussed return for packing removal 2 days  Discussed indications to return  Disposition  Final diagnoses:   Abscess of left forearm     Time reflects when diagnosis was documented in both MDM as applicable and the Disposition within this note     Time User Action Codes Description Comment    9/25/2019  3:39 PM Power Dial [L02 414] Abscess of left forearm       ED Disposition     ED Disposition Condition Date/Time Comment    Discharge Stable Wed Sep 25, 2019  3:40 PM Noéerik Donaldsons discharge to home/self care  Follow-up Information    None         Discharge Medication List as of 9/25/2019  3:42 PM      START taking these medications    Details   cephalexin (KEFLEX) 500 mg capsule Take 1 capsule (500 mg total) by mouth every 6 (six) hours for 10 days, Starting Wed 9/25/2019, Until Sat 10/5/2019, Print         CONTINUE these medications which have NOT CHANGED    Details   acetaminophen (TYLENOL) 650 mg CR tablet Take 1 tablet (650 mg total) by mouth every 8 (eight) hours as needed for mild pain, Starting Fri 7/19/2019, Normal      neomycin-polymyxin-hydrocortisone (CORTISPORIN) 0 35%-10,000 units/mL-1% otic suspension Administer 4 drops into ears every 6 (six) hours for 7 days, Starting Sat 10/13/2018, Until Sat 10/20/2018, Print           No discharge procedures on file      ED Provider  Electronically Signed by           Kayla Handy MD  09/25/19 [id number]

## 2019-11-18 ENCOUNTER — HOSPITAL ENCOUNTER (EMERGENCY)
Facility: HOSPITAL | Age: 29
Discharge: HOME | End: 2019-11-18
Attending: EMERGENCY MEDICINE | Admitting: EMERGENCY MEDICINE
Payer: COMMERCIAL

## 2019-11-18 VITALS
RESPIRATION RATE: 18 BRPM | DIASTOLIC BLOOD PRESSURE: 73 MMHG | OXYGEN SATURATION: 100 % | HEART RATE: 68 BPM | TEMPERATURE: 97.5 F | SYSTOLIC BLOOD PRESSURE: 123 MMHG

## 2019-11-18 DIAGNOSIS — L02.414 ABSCESS OF LEFT FOREARM: Primary | ICD-10-CM

## 2019-11-18 PROCEDURE — 99282 EMERGENCY DEPT VISIT SF MDM: CPT

## 2019-11-18 PROCEDURE — 99284 EMERGENCY DEPT VISIT MOD MDM: CPT | Performed by: PHYSICIAN ASSISTANT

## 2019-11-18 PROCEDURE — 10061 I&D ABSCESS COMP/MULTIPLE: CPT | Performed by: PHYSICIAN ASSISTANT

## 2019-11-18 RX ORDER — LIDOCAINE HYDROCHLORIDE AND EPINEPHRINE 10; 10 MG/ML; UG/ML
5 INJECTION, SOLUTION INFILTRATION; PERINEURAL ONCE
Status: COMPLETED | OUTPATIENT
Start: 2019-11-18 | End: 2019-11-18

## 2019-11-18 RX ORDER — CEPHALEXIN 500 MG/1
500 CAPSULE ORAL 3 TIMES DAILY
Qty: 30 CAPSULE | Refills: 0 | Status: SHIPPED | OUTPATIENT
Start: 2019-11-18 | End: 2019-11-28

## 2019-11-18 RX ORDER — SULFAMETHOXAZOLE AND TRIMETHOPRIM 800; 160 MG/1; MG/1
1 TABLET ORAL 2 TIMES DAILY
Qty: 20 TABLET | Refills: 0 | Status: SHIPPED | OUTPATIENT
Start: 2019-11-18 | End: 2019-11-28

## 2019-11-18 RX ORDER — OXYCODONE HYDROCHLORIDE AND ACETAMINOPHEN 5; 325 MG/1; MG/1
1 TABLET ORAL EVERY 6 HOURS PRN
Qty: 8 TABLET | Refills: 0 | Status: SHIPPED | OUTPATIENT
Start: 2019-11-18 | End: 2019-11-21

## 2019-11-18 RX ADMIN — LIDOCAINE HYDROCHLORIDE AND EPINEPHRINE 5 ML: 10; 10 INJECTION, SOLUTION INFILTRATION; PERINEURAL at 12:22

## 2019-11-18 NOTE — DISCHARGE INSTRUCTIONS
Packing removal in 2 days, you may pull the packing herself for return to the emergency department for packing removal   Keep a dressing over the area  It will continue to drain for the next few days  Monitor for any signs of spreading redness, fevers, or any numbness tingling or weakness to the upper extremity  Return to the emergency department immediately if any of these or other worrisome symptoms developed  Take antibiotics as directed

## 2019-11-18 NOTE — ED PROVIDER NOTES
History  Chief Complaint   Patient presents with    Abscess     pt c/o abscess on left forearm since saturday       34 y o  male with no significant past medical history presents to ED with chief complaint of left forearm abscess  Onset of symptoms reported as 2 days ago  Location of symptoms reported as left forearm  Quality is reported as localized redness and swelling  Severity is reported as moderate  Associated symptoms: denies paralysis, paraesthesias or weakness to left upper extremity, denies fevers or chills, denies nausea or vomiting, denies joint swelling or redness  Modifying factors: palpation of affected area exacerbates pain  Context:  Patient reports he has had 2 prior similar episodes in the past consistent with abscess  He is required drainage of both of them previously  He specifically denies injecting anything into the area  He denies any history of drug use currently or in the past   Denies any specific fall injury or trauma to the area  He reports a past 2 days initially  The area started as a slight bit of redness but has increased in size, swelling and pain  Reviewed past medical history and visits via EPIC:  Patient last seen in the emergency department on September 25, 2018 for evaluation of forearm abscess  History provided by:  Patient and relative   used: No    Abscess   Associated symptoms: no fatigue, no fever, no headaches, no nausea and no vomiting        Prior to Admission Medications   Prescriptions Last Dose Informant Patient Reported? Taking?   acetaminophen (TYLENOL) 650 mg CR tablet   No No   Sig: Take 1 tablet (650 mg total) by mouth every 8 (eight) hours as needed for mild pain   neomycin-polymyxin-hydrocortisone (CORTISPORIN) 0 35%-10,000 units/mL-1% otic suspension   No No   Sig: Administer 4 drops into ears every 6 (six) hours for 7 days      Facility-Administered Medications: None       History reviewed   No pertinent past medical history  Past Surgical History:   Procedure Laterality Date    INCISION AND DRAINAGE OF WOUND Left 7/16/2019    Procedure: INCISION AND DRAINAGE (I&D) EXTREMITY;  Surgeon: Sachin Andrade MD;  Location: [facility];  Service: General    NEPHROSTOMY      since removed    PARATHYROID GLAND SURGERY      TUMOR REMOVAL      parathyroid gland       History reviewed  No pertinent family history  I have reviewed and agree with the history as documented  Social History     Tobacco Use    Smoking status: Current Every Day Smoker     Packs/day: 0 50     Types: Cigarettes    Smokeless tobacco: Former User   Substance Use Topics    Alcohol use: Never     Frequency: Never     Comment: occ    Drug use: Never        Review of Systems   Constitutional: Negative for activity change, appetite change, chills, diaphoresis, fatigue, fever and unexpected weight change  HENT: Negative for congestion, dental problem, drooling, ear discharge, ear pain, facial swelling, hearing loss, mouth sores, nosebleeds, postnasal drip, rhinorrhea, sinus pressure, sinus pain, sneezing, sore throat, tinnitus, trouble swallowing and voice change  Eyes: Negative for photophobia, pain, discharge, redness, itching and visual disturbance  Respiratory: Negative for cough, chest tightness, shortness of breath and wheezing  Cardiovascular: Negative for chest pain, palpitations and leg swelling  Gastrointestinal: Negative for abdominal distention, abdominal pain, constipation, diarrhea, nausea and vomiting  Endocrine: Negative for cold intolerance, heat intolerance, polydipsia, polyphagia and polyuria  Genitourinary: Negative for difficulty urinating, dysuria, flank pain, frequency, hematuria and urgency  Musculoskeletal: Negative for arthralgias, back pain, gait problem, joint swelling, myalgias, neck pain and neck stiffness  Skin: Positive for wound  Negative for color change, pallor and rash     Allergic/Immunologic: Negative for environmental allergies, food allergies and immunocompromised state  Neurological: Negative for dizziness, tremors, seizures, syncope, facial asymmetry, speech difficulty, weakness, light-headedness, numbness and headaches  Hematological: Negative for adenopathy  Does not bruise/bleed easily  Psychiatric/Behavioral: Negative for agitation, confusion and hallucinations  The patient is not nervous/anxious  All other systems reviewed and are negative  Physical Exam  Physical Exam   Constitutional: He is oriented to person, place, and time  He appears well-developed and well-nourished  No distress  /73 (BP Location: Left arm)   Pulse 68   Temp 97 5 °F (36 4 °C) (Oral)   Resp 18   SpO2 100%    HENT:   Head: Normocephalic and atraumatic  Right Ear: External ear normal    Left Ear: External ear normal    Nose: Nose normal    Mouth/Throat: Oropharynx is clear and moist  No oropharyngeal exudate  Eyes: Pupils are equal, round, and reactive to light  Conjunctivae and EOM are normal  Right eye exhibits no discharge  Left eye exhibits no discharge  No scleral icterus  Neck: Normal range of motion  Neck supple  No tracheal deviation present  No thyromegaly present  Cardiovascular: Normal rate, regular rhythm and intact distal pulses  Pulmonary/Chest: Effort normal and breath sounds normal  No stridor  No respiratory distress  He has no wheezes  He has no rales  He exhibits no tenderness  Abdominal: Soft  Bowel sounds are normal  He exhibits no distension and no mass  There is no tenderness  There is no rebound and no guarding  Musculoskeletal: Normal range of motion  He exhibits tenderness  He exhibits no edema or deformity  Lymphadenopathy:     He has no cervical adenopathy  Neurological: He is alert and oriented to person, place, and time  He displays normal reflexes  No cranial nerve deficit or sensory deficit  He exhibits normal muscle tone   Coordination normal    Skin: Skin is warm and dry  Capillary refill takes less than 2 seconds  No rash noted  He is not diaphoretic  There is erythema  No pallor  There is a 3 5 cm circular area of soft tissue swelling, fluctuance and induration consistent with abscess present to the left forearm  Psychiatric: He has a normal mood and affect  His behavior is normal  Judgment and thought content normal    Nursing note and vitals reviewed  Vital Signs  ED Triage Vitals [11/18/19 1130]   Temperature Pulse Respirations Blood Pressure SpO2   97 5 °F (36 4 °C) 68 18 123/73 100 %      Temp Source Heart Rate Source Patient Position - Orthostatic VS BP Location FiO2 (%)   Oral Monitor Sitting Left arm --      Pain Score       8           Vitals:    11/18/19 1130   BP: 123/73   Pulse: 68   Patient Position - Orthostatic VS: Sitting         Visual Acuity      ED Medications  Medications   lidocaine-epinephrine (XYLOCAINE/EPINEPHRINE) 1 %-1:100,000 injection 5 mL (5 mL Infiltration Given 11/18/19 1222)       Diagnostic Studies  Results Reviewed     None                 No orders to display              Procedures  Incision and drain  Date/Time: 11/18/2019 1:05 PM  Performed by: Colette Cifuentes PA-C  Authorized by: Colette Cifuentes PA-C     Patient location:  ED  Other Assisting Provider: No    Consent:     Consent obtained:  Verbal    Consent given by:  Patient    Risks discussed:  Bleeding, damage to other organs, incomplete drainage, infection and pain    Alternatives discussed:  No treatment  Universal protocol:     Patient identity confirmed:  Verbally with patient  Location:     Type:  Abscess    Size:  4 cm    Location:  Upper extremity    Upper extremity location:  L arm  Anesthesia (see MAR for exact dosages):      Anesthesia method:  Local infiltration    Local anesthetic:  Lidocaine 1% WITH epi  Procedure details:     Complexity:  Complex    Needle aspiration: no      Incision types:  Elliptical    Scalpel blade:  11    Approach:  Puncture Incision depth:  Subcutaneous    Wound management:  Probed and deloculated, irrigated with saline, extensive cleaning and debrided    Irrigation with saline:  Yes    Hemostat:  Yes    Drainage:  Purulent    Drainage amount:  Copious    Wound treatment:  Packing placed    Packing materials:  1/4 in iodoform gauze  Post-procedure details:     Patient tolerance of procedure: Tolerated well, no immediate complications    Complication (if applicable):  None  Comments:      Distal neurovascular tendon intact all fingers on postprocedure exam            ED Course                               MDM  Number of Diagnoses or Management Options  Abscess of left forearm: new and does not require workup  Diagnosis management comments: Discussed with patient symptoms appear most consistent with cutaneous abscess  Patient has had 2 previous ones in the past   Incision and drainage successful for large amount of purulent material   Packing was placed  Discussed care of incision and drainage site including removal pack on 2 days, use of antibiotics, topical use of chlorhexidine soap and follow up with primary care physician and general surgery in 3-5 days for recheck and further evaluation of symptoms  Patient has no clinical findings to suggest compartment syndrome, also no findings to suggest osteomyelitis  Reviewed reasons to return to ed  Patient verbalized understanding of diagnosis and agreement with discharge plan of care as well as understanding of reasons to return to ed  Standard narcotic precautions given  I have reasonably determine that electronically prescribing a controlled substance would be impractical for the patient to obtain the controlled substance prescribed by electronic prescription or would cause an untimely delay resulting in an adverse impact on the patient's medical condition              Amount and/or Complexity of Data Reviewed  Obtain history from someone other than the patient: yes (Sibling at bedside  )  Review and summarize past medical records: yes    Patient Progress  Patient progress: stable      Disposition  Final diagnoses:   Abscess of left forearm     Time reflects when diagnosis was documented in both MDM as applicable and the Disposition within this note     Time User Action Codes Description Comment    11/18/2019 12:59 PM Vi Bradshaw Add [L02 414] Abscess of left forearm       ED Disposition     ED Disposition Condition Date/Time Comment    Discharge Stable Mon Nov 18, 2019 12:59 PM Karen Henderson discharge to home/self care  Follow-up Information     Follow up With Specialties Details Why Contact Info Additional [address] [facility] Emergency Department Emergency Medicine Go to  If symptoms worsen [address] [facility] [address] [phone] [place] ED, [address] [place], [place], [address], MD General Surgery Call in 1 day for further evaluation of symptoms [address]  [facility] 18  [address] [facility]       Perry Marquis MD Family Medicine Call in 1 day for further evaluation of symptoms [address] [facility],[address]  [facility]  [phone]             Discharge Medication List as of 11/18/2019  1:02 PM      START taking these medications    Details   cephalexin (KEFLEX) 500 mg capsule Take 1 capsule (500 mg total) by mouth 3 (three) times a day for 10 days, Starting Mon 11/18/2019, Until Thu 11/28/2019, Print      oxyCODONE-acetaminophen (PERCOCET) 5-325 mg per tablet Take 1 tablet by mouth every 6 (six) hours as needed for moderate pain (dx abscess/initial rx ) for up to 3 days Label no driving no etoh  Initial rx    Dx:Max Daily Amount: 4 tablets, Starting Mon 11/18/2019, Until Thu 11/21/2019, Print      sulfamethoxazole-trimethoprim (BACTRIM DS) 800-160 mg per tablet Take 1 tablet by mouth 2 (two) times a day for 10 days smx-tmp DS (BACTRIM) 800-160 mg tabs (1tab q12 D10), Starting Mon 11/18/2019, Until Thu 11/28/2019, Print         CONTINUE these medications which have NOT CHANGED    Details   acetaminophen (TYLENOL) 650 mg CR tablet Take 1 tablet (650 mg total) by mouth every 8 (eight) hours as needed for mild pain, Starting Fri 7/19/2019, Normal      neomycin-polymyxin-hydrocortisone (CORTISPORIN) 0 35%-10,000 units/mL-1% otic suspension Administer 4 drops into ears every 6 (six) hours for 7 days, Starting Sat 10/13/2018, Until Sat 10/20/2018, Print           No discharge procedures on file      ED Provider  Electronically Signed by           Paula Pool PA-C  11/18/19 0406

## 2019-12-18 ENCOUNTER — HOSPITAL ENCOUNTER (EMERGENCY)
Facility: HOSPITAL | Age: 29
End: 2019-12-18
Attending: EMERGENCY MEDICINE | Admitting: PSYCHIATRY & NEUROLOGY
Payer: COMMERCIAL

## 2019-12-18 ENCOUNTER — HOSPITAL ENCOUNTER (INPATIENT)
Facility: HOSPITAL | Age: 29
LOS: 5 days | Discharge: HOME | DRG: 751 | End: 2019-12-23
Attending: PSYCHIATRY & NEUROLOGY | Admitting: PSYCHIATRY & NEUROLOGY
Payer: COMMERCIAL

## 2019-12-18 VITALS
SYSTOLIC BLOOD PRESSURE: 135 MMHG | HEIGHT: 68 IN | BODY MASS INDEX: 23.79 KG/M2 | RESPIRATION RATE: 16 BRPM | WEIGHT: 156.97 LBS | DIASTOLIC BLOOD PRESSURE: 66 MMHG | HEART RATE: 77 BPM | TEMPERATURE: 98.4 F | OXYGEN SATURATION: 99 %

## 2019-12-18 DIAGNOSIS — R79.89 LOW TSH LEVEL: ICD-10-CM

## 2019-12-18 DIAGNOSIS — F11.23 HEROIN WITHDRAWAL (HCC): Primary | ICD-10-CM

## 2019-12-18 DIAGNOSIS — F32.A DEPRESSION WITH SUICIDAL IDEATION: ICD-10-CM

## 2019-12-18 DIAGNOSIS — Z00.8 MEDICAL CLEARANCE FOR PSYCHIATRIC ADMISSION: ICD-10-CM

## 2019-12-18 DIAGNOSIS — F33.2 MAJOR DEPRESSIVE DISORDER, RECURRENT SEVERE WITHOUT PSYCHOTIC FEATURES (HCC): Primary | Chronic | ICD-10-CM

## 2019-12-18 DIAGNOSIS — R45.851 DEPRESSION WITH SUICIDAL IDEATION: ICD-10-CM

## 2019-12-18 DIAGNOSIS — R74.8 ELEVATED LIVER ENZYMES: ICD-10-CM

## 2019-12-18 DIAGNOSIS — Z00.8 MEDICAL CLEARANCE FOR PSYCHIATRIC ADMISSION: Primary | ICD-10-CM

## 2019-12-18 LAB
AMPHETAMINES SERPL QL SCN: NEGATIVE
BARBITURATES UR QL: POSITIVE
BENZODIAZ UR QL: NEGATIVE
COCAINE UR QL: NEGATIVE
ETHANOL EXG-MCNC: 0 MG/DL
METHADONE UR QL: NEGATIVE
OPIATES UR QL SCN: POSITIVE
PCP UR QL: NEGATIVE
THC UR QL: NEGATIVE
TSH SERPL DL<=0.05 MIU/L-ACNC: 0.33 UIU/ML (ref 0.47–4.68)

## 2019-12-18 PROCEDURE — 80307 DRUG TEST PRSMV CHEM ANLYZR: CPT | Performed by: EMERGENCY MEDICINE

## 2019-12-18 PROCEDURE — 84443 ASSAY THYROID STIM HORMONE: CPT | Performed by: PSYCHIATRY & NEUROLOGY

## 2019-12-18 PROCEDURE — 93005 ELECTROCARDIOGRAM TRACING: CPT

## 2019-12-18 PROCEDURE — 99285 EMERGENCY DEPT VISIT HI MDM: CPT | Performed by: EMERGENCY MEDICINE

## 2019-12-18 PROCEDURE — 86592 SYPHILIS TEST NON-TREP QUAL: CPT | Performed by: PSYCHIATRY & NEUROLOGY

## 2019-12-18 PROCEDURE — 99285 EMERGENCY DEPT VISIT HI MDM: CPT

## 2019-12-18 PROCEDURE — 82075 ASSAY OF BREATH ETHANOL: CPT | Performed by: EMERGENCY MEDICINE

## 2019-12-18 RX ORDER — DICYCLOMINE HCL 20 MG
20 TABLET ORAL EVERY 6 HOURS PRN
Status: CANCELLED | OUTPATIENT
Start: 2019-12-18

## 2019-12-18 RX ORDER — DICYCLOMINE HCL 20 MG
20 TABLET ORAL ONCE
Status: COMPLETED | OUTPATIENT
Start: 2019-12-18 | End: 2019-12-18

## 2019-12-18 RX ORDER — BENZTROPINE MESYLATE 1 MG/1
1 TABLET ORAL EVERY 6 HOURS PRN
Status: DISCONTINUED | OUTPATIENT
Start: 2019-12-18 | End: 2019-12-19

## 2019-12-18 RX ORDER — RISPERIDONE 1 MG/1
1 TABLET, ORALLY DISINTEGRATING ORAL EVERY 6 HOURS PRN
Status: CANCELLED | OUTPATIENT
Start: 2019-12-18

## 2019-12-18 RX ORDER — ACETAMINOPHEN 325 MG/1
650 TABLET ORAL EVERY 6 HOURS PRN
Status: DISCONTINUED | OUTPATIENT
Start: 2019-12-18 | End: 2019-12-19

## 2019-12-18 RX ORDER — LORAZEPAM 1 MG/1
1 TABLET ORAL EVERY 8 HOURS PRN
Status: CANCELLED | OUTPATIENT
Start: 2019-12-18

## 2019-12-18 RX ORDER — HYDROXYZINE HYDROCHLORIDE 25 MG/1
25 TABLET, FILM COATED ORAL ONCE
Status: COMPLETED | OUTPATIENT
Start: 2019-12-18 | End: 2019-12-18

## 2019-12-18 RX ORDER — ACETAMINOPHEN 325 MG/1
650 TABLET ORAL EVERY 6 HOURS PRN
Status: CANCELLED | OUTPATIENT
Start: 2019-12-18

## 2019-12-18 RX ORDER — METHOCARBAMOL 500 MG/1
500 TABLET, FILM COATED ORAL EVERY 6 HOURS PRN
Status: CANCELLED | OUTPATIENT
Start: 2019-12-18

## 2019-12-18 RX ORDER — HALOPERIDOL 5 MG/ML
5 INJECTION INTRAMUSCULAR EVERY 6 HOURS PRN
Status: DISCONTINUED | OUTPATIENT
Start: 2019-12-18 | End: 2019-12-23 | Stop reason: HOSPADM

## 2019-12-18 RX ORDER — MAGNESIUM HYDROXIDE/ALUMINUM HYDROXICE/SIMETHICONE 120; 1200; 1200 MG/30ML; MG/30ML; MG/30ML
30 SUSPENSION ORAL EVERY 4 HOURS PRN
Status: DISCONTINUED | OUTPATIENT
Start: 2019-12-18 | End: 2019-12-23 | Stop reason: HOSPADM

## 2019-12-18 RX ORDER — HALOPERIDOL 5 MG/ML
5 INJECTION INTRAMUSCULAR EVERY 6 HOURS PRN
Status: CANCELLED | OUTPATIENT
Start: 2019-12-18

## 2019-12-18 RX ORDER — CLONIDINE HYDROCHLORIDE 0.1 MG/1
0.1 TABLET ORAL ONCE
Status: COMPLETED | OUTPATIENT
Start: 2019-12-18 | End: 2019-12-18

## 2019-12-18 RX ORDER — DICYCLOMINE HCL 20 MG
20 TABLET ORAL EVERY 6 HOURS PRN
Status: DISCONTINUED | OUTPATIENT
Start: 2019-12-18 | End: 2019-12-23 | Stop reason: HOSPADM

## 2019-12-18 RX ORDER — TRAZODONE HYDROCHLORIDE 50 MG/1
50 TABLET ORAL
Status: CANCELLED | OUTPATIENT
Start: 2019-12-18

## 2019-12-18 RX ORDER — HALOPERIDOL 5 MG
5 TABLET ORAL EVERY 8 HOURS PRN
Status: CANCELLED | OUTPATIENT
Start: 2019-12-18

## 2019-12-18 RX ORDER — BENZTROPINE MESYLATE 1 MG/ML
1 INJECTION INTRAMUSCULAR; INTRAVENOUS EVERY 6 HOURS PRN
Status: CANCELLED | OUTPATIENT
Start: 2019-12-18

## 2019-12-18 RX ORDER — IBUPROFEN 600 MG/1
600 TABLET ORAL EVERY 8 HOURS PRN
Status: DISCONTINUED | OUTPATIENT
Start: 2019-12-18 | End: 2019-12-23 | Stop reason: HOSPADM

## 2019-12-18 RX ORDER — IBUPROFEN 400 MG/1
800 TABLET ORAL EVERY 8 HOURS PRN
Status: CANCELLED | OUTPATIENT
Start: 2019-12-18

## 2019-12-18 RX ORDER — ONDANSETRON 4 MG/1
4 TABLET, ORALLY DISINTEGRATING ORAL ONCE
Status: COMPLETED | OUTPATIENT
Start: 2019-12-18 | End: 2019-12-18

## 2019-12-18 RX ORDER — HYDROXYZINE HYDROCHLORIDE 25 MG/1
25 TABLET, FILM COATED ORAL EVERY 6 HOURS PRN
Status: CANCELLED | OUTPATIENT
Start: 2019-12-18

## 2019-12-18 RX ORDER — HALOPERIDOL 5 MG
5 TABLET ORAL EVERY 6 HOURS PRN
Status: CANCELLED | OUTPATIENT
Start: 2019-12-18

## 2019-12-18 RX ORDER — NICOTINE 21 MG/24HR
1 PATCH, TRANSDERMAL 24 HOURS TRANSDERMAL DAILY
Status: CANCELLED | OUTPATIENT
Start: 2019-12-19

## 2019-12-18 RX ORDER — CLONIDINE HYDROCHLORIDE 0.1 MG/1
0.1 TABLET ORAL EVERY 4 HOURS PRN
Status: CANCELLED | OUTPATIENT
Start: 2019-12-18

## 2019-12-18 RX ORDER — IBUPROFEN 400 MG/1
800 TABLET ORAL EVERY 8 HOURS PRN
Status: DISCONTINUED | OUTPATIENT
Start: 2019-12-18 | End: 2019-12-23 | Stop reason: HOSPADM

## 2019-12-18 RX ORDER — HALOPERIDOL 5 MG/ML
5 INJECTION INTRAMUSCULAR EVERY 6 HOURS PRN
Status: DISCONTINUED | OUTPATIENT
Start: 2019-12-18 | End: 2019-12-18

## 2019-12-18 RX ORDER — IBUPROFEN 600 MG/1
600 TABLET ORAL EVERY 8 HOURS PRN
Status: CANCELLED | OUTPATIENT
Start: 2019-12-18

## 2019-12-18 RX ORDER — METHOCARBAMOL 500 MG/1
500 TABLET, FILM COATED ORAL EVERY 6 HOURS PRN
Status: DISCONTINUED | OUTPATIENT
Start: 2019-12-18 | End: 2019-12-23 | Stop reason: HOSPADM

## 2019-12-18 RX ORDER — BENZTROPINE MESYLATE 1 MG/ML
1 INJECTION INTRAMUSCULAR; INTRAVENOUS EVERY 6 HOURS PRN
Status: DISCONTINUED | OUTPATIENT
Start: 2019-12-18 | End: 2019-12-19

## 2019-12-18 RX ORDER — CLONIDINE HYDROCHLORIDE 0.1 MG/1
0.1 TABLET ORAL EVERY 4 HOURS PRN
Status: DISCONTINUED | OUTPATIENT
Start: 2019-12-18 | End: 2019-12-23 | Stop reason: HOSPADM

## 2019-12-18 RX ORDER — DICYCLOMINE HYDROCHLORIDE 10 MG/1
10 CAPSULE ORAL EVERY 4 HOURS PRN
Status: CANCELLED | OUTPATIENT
Start: 2019-12-18

## 2019-12-18 RX ORDER — MAGNESIUM HYDROXIDE/ALUMINUM HYDROXICE/SIMETHICONE 120; 1200; 1200 MG/30ML; MG/30ML; MG/30ML
30 SUSPENSION ORAL EVERY 4 HOURS PRN
Status: CANCELLED | OUTPATIENT
Start: 2019-12-18

## 2019-12-18 RX ORDER — HYDROXYZINE HYDROCHLORIDE 25 MG/1
25 TABLET, FILM COATED ORAL EVERY 6 HOURS PRN
Status: DISCONTINUED | OUTPATIENT
Start: 2019-12-18 | End: 2019-12-23 | Stop reason: HOSPADM

## 2019-12-18 RX ORDER — BENZTROPINE MESYLATE 1 MG/1
1 TABLET ORAL EVERY 6 HOURS PRN
Status: CANCELLED | OUTPATIENT
Start: 2019-12-18

## 2019-12-18 RX ORDER — HALOPERIDOL 5 MG
5 TABLET ORAL EVERY 6 HOURS PRN
Status: DISCONTINUED | OUTPATIENT
Start: 2019-12-18 | End: 2019-12-19

## 2019-12-18 RX ORDER — TRAZODONE HYDROCHLORIDE 50 MG/1
50 TABLET ORAL
Status: DISCONTINUED | OUTPATIENT
Start: 2019-12-18 | End: 2019-12-23 | Stop reason: HOSPADM

## 2019-12-18 RX ORDER — RISPERIDONE 1 MG/1
1 TABLET, ORALLY DISINTEGRATING ORAL EVERY 6 HOURS PRN
Status: DISCONTINUED | OUTPATIENT
Start: 2019-12-18 | End: 2019-12-23 | Stop reason: HOSPADM

## 2019-12-18 RX ADMIN — ONDANSETRON 4 MG: 4 TABLET, ORALLY DISINTEGRATING ORAL at 15:19

## 2019-12-18 RX ADMIN — HYDROXYZINE HYDROCHLORIDE 25 MG: 25 TABLET ORAL at 15:18

## 2019-12-18 RX ADMIN — CLONIDINE HYDROCHLORIDE 0.1 MG: 0.1 TABLET ORAL at 15:19

## 2019-12-18 RX ADMIN — DICYCLOMINE HYDROCHLORIDE 20 MG: 20 TABLET ORAL at 15:19

## 2019-12-18 RX ADMIN — METHOCARBAMOL TABLETS 500 MG: 500 TABLET, COATED ORAL at 21:46

## 2019-12-18 RX ADMIN — DICYCLOMINE HYDROCHLORIDE 20 MG: 20 TABLET ORAL at 21:46

## 2019-12-18 NOTE — ED NOTES
201 sent to  Intake for review at [facility]       Quincy Modi LMSW  12/18/19  0264

## 2019-12-18 NOTE — ED NOTES
As per AM CW, pt has no insurance      Ras Estevez, [address] [employer] [facility]  12/18/19 17:31

## 2019-12-18 NOTE — ED PROVIDER NOTES
History  Chief Complaint   Patient presents with    Withdrawal - Drug     pt uses heroin daily  today he ran out of money and did not have enough heroin this morning  ended up being picked up by police when he stated that he was going through withdrawal     Patient is a 63-year-old male with past medical history of IV heroin abuse, presents to the emergency department by ambulance for heroin withdrawal, depression and suicidal ideation  Patient states that he uses heroin on a daily basis, approximately 1 bundle per day  Last use was yesterday and he already feels as though he is withdrawing  He has been having restless legs, dizziness, nausea without vomiting, abdominal cramping and anxiety  He states on occasion he will use methamphetamine when he runs out of heroin or to curb his withdrawal symptoms but he denies any use today  He admits that for a few weeks he has been increasingly more depressed and has felt suicidal but denies any suicidal plan  He denies ever having suicidal ideation like this before  He denies any prior suicidal attempts  He denies homicidal ideation, auditory or visual hallucinations  Denies alcohol use  Rest of review of systems is negative  Patient is requesting detox and rehab  History provided by:  Patient and EMS personnel   used: No        Prior to Admission Medications   Prescriptions Last Dose Informant Patient Reported?  Taking?   acetaminophen (TYLENOL) 650 mg CR tablet   No No   Sig: Take 1 tablet (650 mg total) by mouth every 8 (eight) hours as needed for mild pain   Patient not taking: Reported on 12/18/2019   neomycin-polymyxin-hydrocortisone (CORTISPORIN) 0 35%-10,000 units/mL-1% otic suspension   No No   Sig: Administer 4 drops into ears every 6 (six) hours for 7 days      Facility-Administered Medications: None       Past Medical History:   Diagnosis Date    Addiction to drug ([facility] [address] )     Depression     Head injury     Panic attack     Substance abuse [facility])        Past Surgical History:   Procedure Laterality Date    INCISION AND DRAINAGE OF WOUND Left 7/16/2019    Procedure: INCISION AND DRAINAGE (I&D) EXTREMITY;  Surgeon: Anjel Dorsey MD;  Location: [facility];  Service: General    NEPHROSTOMY      since removed    PARATHYROID GLAND SURGERY      TUMOR REMOVAL      parathyroid gland       Family History   Family history unknown: Yes     I have reviewed and agree with the history as documented  Social History     Tobacco Use    Smoking status: Current Every Day Smoker     Packs/day: 0 50     Types: Cigarettes    Smokeless tobacco: Former User   Substance Use Topics    Alcohol use: Never     Frequency: Never     Binge frequency: Never     Comment: occ    Drug use: Yes     Frequency: 7 0 times per week     Types: Heroin     Comment: daily        Review of Systems   Constitutional: Positive for chills  Negative for fever  HENT: Negative for congestion, ear pain, rhinorrhea and sore throat  Respiratory: Negative for cough, chest tightness, shortness of breath and wheezing  Cardiovascular: Negative for chest pain and palpitations  Gastrointestinal: Positive for abdominal pain and nausea  Negative for constipation, diarrhea and vomiting  Genitourinary: Negative for dysuria, flank pain, frequency and hematuria  Musculoskeletal: Negative for back pain, neck pain and neck stiffness  +Restless legs   Skin: Negative for color change, pallor, rash and wound  Allergic/Immunologic: Negative for immunocompromised state  Neurological: Positive for dizziness and light-headedness  Negative for seizures, syncope, weakness, numbness and headaches  Hematological: Negative for adenopathy  Psychiatric/Behavioral: Positive for dysphoric mood and suicidal ideas  Negative for confusion, decreased concentration, hallucinations and self-injury  The patient is nervous/anxious      All other systems reviewed and are negative  Physical Exam  Physical Exam   Constitutional: He is oriented to person, place, and time  He appears well-developed and well-nourished  No distress  HENT:   Head: Normocephalic and atraumatic  Mouth/Throat: Oropharynx is clear and moist  No oropharyngeal exudate  Eyes: Pupils are equal, round, and reactive to light  Conjunctivae and EOM are normal    Neck: Normal range of motion  Neck supple  No JVD present  Cardiovascular: Normal rate, regular rhythm, normal heart sounds and intact distal pulses  Exam reveals no gallop and no friction rub  No murmur heard  Pulmonary/Chest: Effort normal and breath sounds normal  No respiratory distress  He has no wheezes  He has no rales  He exhibits no tenderness  Abdominal: Soft  Bowel sounds are normal  He exhibits no distension  There is no tenderness  There is no rebound and no guarding  Musculoskeletal: Normal range of motion  He exhibits no edema or tenderness  Neurological: He is alert and oriented to person, place, and time  No gross motor or sensory deficits  No tremors  Skin: Skin is warm and dry  No rash noted  He is not diaphoretic  No pallor  Psychiatric: He has a normal mood and affect  His behavior is normal    Patient appears depressed and is tearful  He appears mildly anxious  Nursing note and vitals reviewed        Vital Signs  ED Triage Vitals [12/18/19 1349]   Temperature Pulse Respirations Blood Pressure SpO2   98 4 °F (36 9 °C) 83 16 140/68 99 %      Temp Source Heart Rate Source Patient Position - Orthostatic VS BP Location FiO2 (%)   Oral Monitor Lying Right arm --      Pain Score       6         Vitals:    12/18/19 1349 12/18/19 1844   BP: 140/68 135/66   BP Location: Right arm Right arm   Pulse: 83 77   Resp: 16 16   Temp: 98 4 °F (36 9 °C)    TempSrc: Oral    SpO2: 99% 99%   Weight: 71 2 kg (156 lb 15 5 oz)    Height: 5' 8" (1 727 m)        Visual Acuity  Visual Acuity      Most Recent Value   L Pupil Size (mm)  4 R Pupil Size (mm)  4          ED Medications  Medications   ondansetron (ZOFRAN-ODT) dispersible tablet 4 mg (4 mg Oral Given 12/18/19 1519)   dicyclomine (BENTYL) tablet 20 mg (20 mg Oral Given 12/18/19 1519)   hydrOXYzine HCL (ATARAX) tablet 25 mg (25 mg Oral Given 12/18/19 1518)   cloNIDine (CATAPRES) tablet 0 1 mg (0 1 mg Oral Given 12/18/19 1519)       Diagnostic Studies  Results Reviewed     Procedure Component Value Units Date/Time    Rapid drug screen, urine [[id number]]  (Abnormal) Collected:  12/18/19 1512    Lab Status:  Final result Specimen:  Urine, Catheter Updated:  12/18/19 1530     Amph/Meth UR Negative     Barbiturate Ur Positive     Benzodiazepine Urine Negative     Cocaine Urine Negative     Methadone Urine Negative     Opiate Urine Positive     PCP Ur Negative     THC Urine Negative    Narrative:       Presumptive report  If requested, specimen will be sent to reference lab for confirmation  FOR MEDICAL PURPOSES ONLY  IF CONFIRMATION NEEDED PLEASE CONTACT THE LAB WITHIN 5 DAYS      Drug Screen Cutoff Levels:  AMPHETAMINE/METHAMPHETAMINES  1000 ng/mL  BARBITURATES     200 ng/mL  BENZODIAZEPINES     200 ng/mL  COCAINE      300 ng/mL  METHADONE      300 ng/mL  OPIATES      300 ng/mL  PHENCYCLIDINE     25 ng/mL  THC       50 ng/mL      POCT alcohol breath test [[id number]]  (Normal) Resulted:  12/18/19 1508    Lab Status:  Final result Updated:  12/18/19 1509     EXTBreath Alcohol 0 000                 No orders to display              Procedures  ECG 12 Lead Documentation Only  Date/Time: 12/18/2019 2:02 PM  Performed by: Gt Whitaker DO  Authorized by: Gt Whitaker DO     ECG reviewed by me, the ED Provider: yes    Patient location:  ED  Previous ECG:     Previous ECG:  Compared to current    Comparison ECG info:  6-    Similarity:  No change  Interpretation:     Interpretation: normal    Rate:     ECG rate:  82    ECG rate assessment: normal    Rhythm:     Rhythm: sinus rhythm Ectopy:     Ectopy: none    QRS:     QRS axis:  Normal    QRS intervals:  Normal  Conduction:     Conduction: normal    ST segments:     ST segments:  Normal  T waves:     T waves: normal               ED Course  ED Course as of Dec 19 1503   Wed Dec 18, 2019   1529 Patient was evaluated by crisis worker and signed 12  Bed search in progress  MDM  Number of Diagnoses or Management Options  Diagnosis management comments: 51-year-old male presents for heroin withdrawal as well as suicidal ideation  Patient has mild withdrawal symptoms and his last use of heroin was yesterday  Will treat with clonidine, Zofran, Bentyl and Atarax  Will consult ED crisis worker due to the depression and suicidal ideation  Patient will likely benefit from a dual detox/psych hospital and patient seems agreeable to this         Amount and/or Complexity of Data Reviewed  Clinical lab tests: ordered and reviewed          Disposition  Final diagnoses:   Heroin withdrawal ([facility] [address] )   Depression with suicidal ideation     Time reflects when diagnosis was documented in both MDM as applicable and the Disposition within this note     Time User Action Codes Description Comment    12/18/2019  3:03 PM Jerl Sine Add [F11 23] Heroin withdrawal ([facility] [address] )     12/18/2019  3:03 PM Jerl Sine Add [F32 9,  R45 851] Depression with suicidal ideation       ED Disposition     ED Disposition Condition Date/Time Comment    Transfer to Another Facility  Wed Dec 18, 2019  7:10 PM Ellis Louis should be transferred out to [address] ROOT Documentation      Most Recent Value   Patient Condition  The patient has been stabilized such that within reasonable medical probability, no material deterioration of the patient condition or the condition of the unborn child(hitesh) is likely to result from the transfer   Reason for Transfer  Level of Care needed not available at this facility   Benefits of Transfer  Continuity of care Risks of Transfer  Potential for delay in receiving treatment   Accepting Physician  Dr Serene Alvarado Name, [address]    (Name & Tel number)  ALON Servin @ [phone]   [facility]  [facility]   Sending MD  [facility]   Provider Certification  General risk, such as traffic hazards, adverse weather conditions, rough terrain or turbulence, possible failure of equipment (including vehicle or aircraft), or consequences of actions of persons outside the control of the transport personnel      RN Documentation      [address] Name, [address]    (Name & Tel number)  ALON  Sabrina Canavan, [place] @ [phone]   Report Given to  Sanjuana Leonard   [facility]  [facility]   Level of Care  Basic life support   Transfer Date  12/18/19   Transfer Time  1800      Follow-up Information    None         Discharge Medication List as of 12/18/2019  7:10 PM      CONTINUE these medications which have NOT CHANGED    Details   acetaminophen (TYLENOL) 650 mg CR tablet Take 1 tablet (650 mg total) by mouth every 8 (eight) hours as needed for mild pain, Starting Fri 7/19/2019, Normal      neomycin-polymyxin-hydrocortisone (CORTISPORIN) 0 35%-10,000 units/mL-1% otic suspension Administer 4 drops into ears every 6 (six) hours for 7 days, Starting Sat 10/13/2018, Until Sat 10/20/2018, Print           No discharge procedures on file      ED Provider  Electronically Signed by           Sherryle Mound, DO  12/19/19 1756

## 2019-12-18 NOTE — ED NOTES
CW prepared pt's 201 document, EMTALA and Medical Necessity forms for transport      Kim Frey [facility], [address]  12/18/19 17:31

## 2019-12-18 NOTE — ED NOTES
Patient was medically cleared and 1:1 was initiated  Patient changed into paper scrubs, completed urine sample and B A T  Test and was cooperative throughout  Belongings were logged       Enoc Devlin  12/18/19 6341

## 2019-12-18 NOTE — ED NOTES
Patient is accepted at [facility], [address]  Patient is accepted by Dr Abe Galindo per Modesta Joseph  Transportation is arranged with Chacha Salazar of [facility] for [facility]  Transportation is scheduled for 18:30  Patient may go to the floor at 19:30       CW informed Krys SOFIA of Intake of pt's ETA  Nurse report is to be called to [phone] prior to patient transfer      [facility] [address]  12/18/19 17:25

## 2019-12-18 NOTE — ED NOTES
Call placed to [facility], spoke with Magen, who requested clinical be faxed       Burt Solano LMSW  12/18/19  0017

## 2019-12-18 NOTE — ED NOTES
Per Magen at [facility], they will not have a male bed today       Hawa Patricio LMSW  12/18/19  0366

## 2019-12-18 NOTE — ED NOTES
Pt is a 34 y o  male who presented to the ED due to increased depression, suicidal ideation, and illicit drug use  Patient reports that he's been using IV heroin since age 13, and that he uses a bundle per day  Patient stated that he's "tired of it" and wants help  Patient also admits to using methamphetamine when withdrawling, as well as intermittent use of marijuana  Patient indicated that he's been in inpatient rehab approximately 1 year ago, but has never received any mental health tx  Patient denies having a plan at this time, and denies any self injurious behaviors in the past  Patient works construction, and resides with his grandmother, who is unaware of the patient's addiction  Patient appears depressed and withdrawn  Patient identifies withdrawal symptoms as restless leg, stomach pain, nausea, increased anxiety and panic attacks  Patient denies vomiting, DT's or seizures  Patient reports that both of his parents are , and denies any stressors other than his drug addiction  Patient denies any sober supports  Chief Complaint   Patient presents with    Withdrawal - Drug     pt uses heroin daily  today he ran out of money and did not have enough heroin this morning  ended up being picked up by police when he stated that he was going through withdrawal     Intake Assessment completed, Safety Risk Assessment completed      Geovanny Poon LMSW  19  0973

## 2019-12-18 NOTE — LETTER
[address] [facility] [address]  [address] [facility] [address]  Dept: [phone]            [id number] TRANSFER CONSENT    NAME Daniele Chatman                                         1990                              MRN [id number]    I have been informed of my rights regarding examination, treatment, and transfer   by Dr Eloise Plunkett DO    Benefits: Continuity of care    Risks: Potential for delay in receiving treatment      { ED EMTALA TRANSFER CHOICES:[id number]}    I authorize the performance of emergency medical procedures and treatments upon me in both transit and upon arrival at the receiving facility  Additionally, I authorize the release of any and all medical records to the receiving facility and request they be transported with me, if possible  I understand that the safest mode of transportation during a medical emergency is an ambulance and that the Hospital advocates the use of this mode of transport  Risks of traveling to the receiving facility by car, including absence of medical control, life sustaining equipment, such as oxygen, and medical personnel has been explained to me and I fully understand them  (BELEN CORRECT BOX BELOW)  Edonius Ferdinand  ]  I consent to the stated transfer and to be transported by ambulance/helicopter  [  ]  I consent to the stated transfer, but refuse transportation by ambulance and accept full responsibility for my transportation by car    I understand the risks of non-ambulance transfers and I exonerate the Hospital and its staff from any deterioration in my condition that results from this refusal     X___________________________________________    DATE  19  TIME__17:______  Signature of patient or legally responsible individual signing on patient behalf           RELATIONSHIP TO PATIENT_________________________                                    Provider Certification    NAME Waldronhelena Chatman  1990                              MRN [id number]    A medical screening exam was performed on the above named patient  Based on the examination:    Condition Necessitating Transfer The primary encounter diagnosis was Heroin withdrawal ([address] )  A diagnosis of Depression with suicidal ideation was also pertinent to this visit  Patient Condition: The patient has been stabilized such that within reasonable medical probability, no material deterioration of the patient condition or the condition of the unborn child(hitesh) is likely to result from the transfer    Reason for Transfer: Level of Care needed not available at this facility    Transfer Requirements: Facility [facility], [address]   · Space available and qualified personnel available for treatment as acknowledged by ALON Salazar @ [phone]  · Agreed to accept transfer and to provide appropriate medical treatment as acknowledged by       Dr Irina Ledesma  · Appropriate medical records of the examination and treatment of the patient are provided at the time of transfer   [address] [facility] __A  A _____  · Transfer will be performed by qualified personnel from [facility]  and appropriate transfer equipment as required, including the use of necessary and appropriate life support measures      Provider Certification: I have examined the patient and explained the following risks and benefits of being transferred/refusing transfer to the patient/family:  General risk, such as traffic hazards, adverse weather conditions, rough terrain or turbulence, possible failure of equipment (including vehicle or aircraft), or consequences of actions of persons outside the control of the transport personnel      Based on these reasonable risks and benefits to the patient and/or the unborn child(hitesh), and based upon the information available at the time of the patients examination, I certify that the medical benefits reasonably to be expected from the provision of appropriate medical treatments at another medical facility outweigh the increasing risks, if any, to the individuals medical condition, and in the case of labor to the unborn child, from effecting the transfer      X____________________________________________ DATE 12/18/19        TIME_______      ORIGINAL - SEND TO MEDICAL RECORDS   COPY - SEND WITH PATIENT DURING TRANSFER

## 2019-12-18 NOTE — LETTER
Section I - General Information    Name of Patient: Travis Youssef                 : 1990    Medicare #:____________________  Transport Date: 19 (PCS is valid for round trips on this date and for all repetitive trips in the 60-day range as noted below )  Origin: [address]                                                         Destination:____[facility], [address]____________________________________________  Is the pt's stay covered under Medicare Part A (PPS/DRG)     (_) YES  (_X) NO  Closest appropriate facility? (_X) YES  (_) NO  If no, why is transport to more distant facility required?________________________  If hosp-hosp transfer, describe services needed at 2nd facility not available at 1st facility? __ IP Tx_________________  If hospice pt, is this transport related to pt's terminal illness? (_) YES (_) NO Describe____________________________________    Section II - Medical Necessity Questionnaire  Ambulance transportation is medically necessary only if other means of transport are contraindicated or would be potentially harmful to the patient  To meet this requirement, the patient must either be "bed confined" or suffer from a condition such that transport by means other than ambulance is contraindicated by the patient's condition   The following questions must be answered by the medical professional signing below for this form to be valid:    1)  Describe the MEDICAL CONDITION (physical and/or mental) of this patient AT [address] [place] [address] that requires the patient to be transported in an ambulance and why transport by other means is contraindicated by the patient's condition:_______SI___________________________________________________________________________________________    2) Is the patient "bed confined" as defined below?     (_) YES  (_X) NO  To be "be confined" the patient must satisfy all three of the following conditions: (1) unable to get up from bed without Assistance; AND (2) unable to ambulate; AND (3) unable to sit in a chair or wheelchair  3) Can this patient safely be transported by car or wheelchair [place] (i e , seated during transport without a medical attendant or monitoring)?   (_) YES  (_X) NO    4) In addition to completing questions 1-3 above, please check any of the following conditions that apply*:  *Note: supporting documentation for any boxes checked must be maintained in the patient's medical records  (_)Contractures   (_)Non-Healed Fractures  (_)Patient is confused (_)Patient is comatose (_)Moderate/severe pain on movement (X_)Danger to self/others  (_)IV meds/fluids required (_)Patient is combative(_)Need or possible need for restraints (_)DVT requires elevation of lower extremity  (_)Medical attendant required (_)Requires oxygen-unable to self administer (_)Special handling/isolation/infection control precautions required (_)Unable to tolerate seated position for time needed to transport (_)Hemodynamic monitoring required en route (_)Unable to sit in a chair or wheelchair due to decubitus ulcers or other wounds (_)Cardiac monitoring required en route (_)Morbid obesity requires additional personnel/equipment to safely handle patient (_)Orthopedic device (backboard, halo, pins, traction, brace, wedge, etc,) requiring special handling during transport (_)Other(specify)_______________________________________________    Section III - Signature of Physician or Healthcare Professional  I certify that the above information is true and correct based on my evaluation of this patient, and represent that the patient requires transport by ambulance and that other forms of transport are contraindicated   I understand that this information will be used by the Centers for Medicare and Medicaid Services (CMS) to support the determination of medical necessity for ambulance services, and I represent that I have personal knowledge of the patient's condition at time of transport  (_) If this box is checked, I also certify that the patient is physically or mentally incapable of signing the ambulance service's claim and that the institution with which I am affiliated has furnished care, services, or assistance to the patient  My signature below is made on behalf of the patient pursuant to 42 CFR §424 36(b)(4)  In accordance with 42 CFR §424 37, the specific reason(s) that the patient is physically or mentally incapable of signing the claim form is as follows: _________________________________________________________________________________________________________      Signature of Physician* or Healthcare Professional______________________________________________________________  Signature Date 12/18/19 (For scheduled repetitive transports, this form is not valid for transports performed more than 60 days after this date)    Printed Name & Credentials of Physician or Healthcare Professional (MD, DO, RN, etc )__A  Iván Meneses CW_____  *Form must be signed by patient's attending physician for scheduled, repetitive transports   For non-repetitive, unscheduled ambulance transports, if unable to obtain the signature of the attending physician, any of the following may sign (choose appropriate option below)  (_) Physician Assistant (_)  Clinical Nurse Specialist (_)  Registered Nurse  (_)  Nurse Practitioner  (X_) Discharge Planner

## 2019-12-19 PROBLEM — F15.10 METHAMPHETAMINE ABUSE (HCC): Chronic | Status: ACTIVE | Noted: 2019-12-19

## 2019-12-19 PROBLEM — R79.89 LOW TSH LEVEL: Status: ACTIVE | Noted: 2019-12-19

## 2019-12-19 PROBLEM — F33.2 MAJOR DEPRESSIVE DISORDER, RECURRENT, SEVERE WITHOUT PSYCHOTIC FEATURES (HCC): Chronic | Status: ACTIVE | Noted: 2019-12-19

## 2019-12-19 PROBLEM — R74.8 ELEVATED LIVER ENZYMES: Status: ACTIVE | Noted: 2019-12-19

## 2019-12-19 PROBLEM — F11.20 OPIOID TYPE DEPENDENCE, CONTINUOUS (HCC): Chronic | Status: ACTIVE | Noted: 2019-12-19

## 2019-12-19 PROBLEM — Z00.8 MEDICAL CLEARANCE FOR PSYCHIATRIC ADMISSION: Status: ACTIVE | Noted: 2019-12-19

## 2019-12-19 LAB
ALBUMIN SERPL BCP-MCNC: 3.9 G/DL (ref 3–5.2)
ALP SERPL-CCNC: 83 U/L (ref 43–122)
ALT SERPL W P-5'-P-CCNC: 339 U/L (ref 9–52)
ANION GAP SERPL CALCULATED.3IONS-SCNC: 5 MMOL/L (ref 5–14)
AST SERPL W P-5'-P-CCNC: 173 U/L (ref 17–59)
ATRIAL RATE: 82 BPM
BASOPHILS # BLD AUTO: 0 THOUSANDS/ΜL (ref 0–0.1)
BASOPHILS NFR BLD AUTO: 1 % (ref 0–1)
BILIRUB SERPL-MCNC: 0.7 MG/DL
BUN SERPL-MCNC: 13 MG/DL (ref 5–25)
CALCIUM SERPL-MCNC: 9.4 MG/DL (ref 8.4–10.2)
CHLORIDE SERPL-SCNC: 105 MMOL/L (ref 97–108)
CHOLEST SERPL-MCNC: 107 MG/DL
CO2 SERPL-SCNC: 27 MMOL/L (ref 22–30)
CREAT SERPL-MCNC: 0.47 MG/DL (ref 0.7–1.5)
EOSINOPHIL # BLD AUTO: 0.1 THOUSAND/ΜL (ref 0–0.4)
EOSINOPHIL NFR BLD AUTO: 2 % (ref 0–6)
ERYTHROCYTE [DISTWIDTH] IN BLOOD BY AUTOMATED COUNT: 13.8 %
GFR SERPL CREATININE-BSD FRML MDRD: 150 ML/MIN/1.73SQ M
GLUCOSE P FAST SERPL-MCNC: 105 MG/DL (ref 70–99)
GLUCOSE SERPL-MCNC: 105 MG/DL (ref 70–99)
HCT VFR BLD AUTO: 41.2 % (ref 41–53)
HDLC SERPL-MCNC: 36 MG/DL
HGB BLD-MCNC: 14.5 G/DL (ref 13.5–17.5)
LDLC SERPL CALC-MCNC: 61 MG/DL
LYMPHOCYTES # BLD AUTO: 2.4 THOUSANDS/ΜL (ref 0.5–4)
LYMPHOCYTES NFR BLD AUTO: 33 % (ref 25–45)
MCH RBC QN AUTO: 31 PG (ref 26–34)
MCHC RBC AUTO-ENTMCNC: 35.1 G/DL (ref 31–36)
MCV RBC AUTO: 88 FL (ref 80–100)
MONOCYTES # BLD AUTO: 0.7 THOUSAND/ΜL (ref 0.2–0.9)
MONOCYTES NFR BLD AUTO: 10 % (ref 1–10)
NEUTROPHILS # BLD AUTO: 3.9 THOUSANDS/ΜL (ref 1.8–7.8)
NEUTS SEG NFR BLD AUTO: 55 % (ref 45–65)
NONHDLC SERPL-MCNC: 71 MG/DL
P AXIS: 15 DEGREES
PLATELET # BLD AUTO: 192 THOUSANDS/UL (ref 150–450)
PMV BLD AUTO: 9.3 FL (ref 8.9–12.7)
POTASSIUM SERPL-SCNC: 3.9 MMOL/L (ref 3.6–5)
PR INTERVAL: 120 MS
PROT SERPL-MCNC: 7 G/DL (ref 5.9–8.4)
QRS AXIS: 52 DEGREES
QRSD INTERVAL: 86 MS
QT INTERVAL: 380 MS
QTC INTERVAL: 443 MS
RBC # BLD AUTO: 4.66 MILLION/UL (ref 4.5–5.9)
RPR SER QL: NORMAL
SODIUM SERPL-SCNC: 137 MMOL/L (ref 137–147)
T WAVE AXIS: 53 DEGREES
TRIGL SERPL-MCNC: 49 MG/DL
VENTRICULAR RATE: 82 BPM
WBC # BLD AUTO: 7.1 THOUSAND/UL (ref 4.5–11)

## 2019-12-19 PROCEDURE — 86803 HEPATITIS C AB TEST: CPT | Performed by: NURSE PRACTITIONER

## 2019-12-19 PROCEDURE — 86705 HEP B CORE ANTIBODY IGM: CPT | Performed by: NURSE PRACTITIONER

## 2019-12-19 PROCEDURE — 99222 1ST HOSP IP/OBS MODERATE 55: CPT | Performed by: INTERNAL MEDICINE

## 2019-12-19 PROCEDURE — 86704 HEP B CORE ANTIBODY TOTAL: CPT | Performed by: NURSE PRACTITIONER

## 2019-12-19 PROCEDURE — 80053 COMPREHEN METABOLIC PANEL: CPT | Performed by: PSYCHIATRY & NEUROLOGY

## 2019-12-19 PROCEDURE — 80074 ACUTE HEPATITIS PANEL: CPT | Performed by: NURSE PRACTITIONER

## 2019-12-19 PROCEDURE — 80061 LIPID PANEL: CPT | Performed by: PSYCHIATRY & NEUROLOGY

## 2019-12-19 PROCEDURE — 87340 HEPATITIS B SURFACE AG IA: CPT | Performed by: NURSE PRACTITIONER

## 2019-12-19 PROCEDURE — 99222 1ST HOSP IP/OBS MODERATE 55: CPT | Performed by: PSYCHIATRY & NEUROLOGY

## 2019-12-19 PROCEDURE — 85025 COMPLETE CBC W/AUTO DIFF WBC: CPT | Performed by: PSYCHIATRY & NEUROLOGY

## 2019-12-19 PROCEDURE — 93010 ELECTROCARDIOGRAM REPORT: CPT | Performed by: INTERNAL MEDICINE

## 2019-12-19 RX ORDER — SERTRALINE HYDROCHLORIDE 25 MG/1
25 TABLET, FILM COATED ORAL ONCE
Status: COMPLETED | OUTPATIENT
Start: 2019-12-19 | End: 2019-12-19

## 2019-12-19 RX ORDER — OLANZAPINE 10 MG/1
10 INJECTION, POWDER, LYOPHILIZED, FOR SOLUTION INTRAMUSCULAR
Status: DISCONTINUED | OUTPATIENT
Start: 2019-12-19 | End: 2019-12-23 | Stop reason: HOSPADM

## 2019-12-19 RX ORDER — OLANZAPINE 10 MG/1
10 TABLET, ORALLY DISINTEGRATING ORAL
Status: DISCONTINUED | OUTPATIENT
Start: 2019-12-19 | End: 2019-12-23 | Stop reason: HOSPADM

## 2019-12-19 RX ORDER — BENZTROPINE MESYLATE 1 MG/ML
1 INJECTION INTRAMUSCULAR; INTRAVENOUS EVERY 6 HOURS PRN
Status: DISCONTINUED | OUTPATIENT
Start: 2019-12-19 | End: 2019-12-23 | Stop reason: HOSPADM

## 2019-12-19 RX ORDER — HYDROXYZINE 50 MG/1
50 TABLET, FILM COATED ORAL EVERY 6 HOURS PRN
Status: DISCONTINUED | OUTPATIENT
Start: 2019-12-19 | End: 2019-12-23 | Stop reason: HOSPADM

## 2019-12-19 RX ORDER — BENZTROPINE MESYLATE 1 MG/1
1 TABLET ORAL EVERY 6 HOURS PRN
Status: DISCONTINUED | OUTPATIENT
Start: 2019-12-19 | End: 2019-12-23 | Stop reason: HOSPADM

## 2019-12-19 RX ORDER — LOPERAMIDE HYDROCHLORIDE 2 MG/1
2 CAPSULE ORAL EVERY 4 HOURS PRN
Status: DISCONTINUED | OUTPATIENT
Start: 2019-12-19 | End: 2019-12-23 | Stop reason: HOSPADM

## 2019-12-19 RX ORDER — LORAZEPAM 2 MG/ML
1 INJECTION INTRAMUSCULAR EVERY 6 HOURS PRN
Status: DISCONTINUED | OUTPATIENT
Start: 2019-12-19 | End: 2019-12-23 | Stop reason: HOSPADM

## 2019-12-19 RX ORDER — CLONIDINE HYDROCHLORIDE 0.1 MG/1
0.1 TABLET ORAL 3 TIMES DAILY
Status: DISPENSED | OUTPATIENT
Start: 2019-12-19 | End: 2019-12-20

## 2019-12-19 RX ORDER — HALOPERIDOL 5 MG
5 TABLET ORAL EVERY 6 HOURS PRN
Status: DISCONTINUED | OUTPATIENT
Start: 2019-12-19 | End: 2019-12-23 | Stop reason: HOSPADM

## 2019-12-19 RX ORDER — ACETAMINOPHEN 325 MG/1
650 TABLET ORAL EVERY 6 HOURS PRN
Status: DISCONTINUED | OUTPATIENT
Start: 2019-12-19 | End: 2019-12-23 | Stop reason: HOSPADM

## 2019-12-19 RX ORDER — GABAPENTIN 100 MG/1
100 CAPSULE ORAL 3 TIMES DAILY
Status: DISCONTINUED | OUTPATIENT
Start: 2019-12-19 | End: 2019-12-20

## 2019-12-19 RX ADMIN — TRAZODONE HYDROCHLORIDE 50 MG: 50 TABLET ORAL at 21:30

## 2019-12-19 RX ADMIN — GABAPENTIN 100 MG: 100 CAPSULE ORAL at 12:22

## 2019-12-19 RX ADMIN — CLONIDINE HYDROCHLORIDE 0.1 MG: 0.1 TABLET ORAL at 21:26

## 2019-12-19 RX ADMIN — METHOCARBAMOL TABLETS 500 MG: 500 TABLET, COATED ORAL at 21:26

## 2019-12-19 RX ADMIN — GABAPENTIN 100 MG: 100 CAPSULE ORAL at 21:26

## 2019-12-19 RX ADMIN — Medication 1 TABLET: at 12:22

## 2019-12-19 RX ADMIN — GABAPENTIN 100 MG: 100 CAPSULE ORAL at 16:55

## 2019-12-19 RX ADMIN — DICYCLOMINE HYDROCHLORIDE 20 MG: 20 TABLET ORAL at 08:55

## 2019-12-19 RX ADMIN — CLONIDINE HYDROCHLORIDE 0.1 MG: 0.1 TABLET ORAL at 08:54

## 2019-12-19 RX ADMIN — CLONIDINE HYDROCHLORIDE 0.1 MG: 0.1 TABLET ORAL at 16:55

## 2019-12-19 RX ADMIN — SERTRALINE HYDROCHLORIDE 25 MG: 25 TABLET ORAL at 12:22

## 2019-12-19 NOTE — PROGRESS NOTES
Patient requested medication for withdrawal symptoms such as restless legs and stomach cramping  Said he didn't sleep at all last night because of it  Given Bentyl and Clonidine po

## 2019-12-19 NOTE — ASSESSMENT & PLAN NOTE
Patient medically cleared for psychiatric admission, EKG reviewed , avoid QT prolonging medications, normal sinus rhythm  We will now sign off please feel free to call if further assistance is needed form internal medicine

## 2019-12-19 NOTE — PROGRESS NOTES
12/19/19 1215   Service   Service   ([address])   Provider Name Erwin Newberry     Pt's H&P was completed for medical clearance   Discussed patient's elevated liver enzymes and history of parathyroid tumor removal

## 2019-12-19 NOTE — PROGRESS NOTES
Pt admitted as 201 from [facility] ED for increased suicidal thoughts with thoughts and recent attempts to OD on drugs  Trigger is holidays when pt reports "My mother  in my arms this time four years ago " Also states lost father a number of years ago, both  by heart complications  Pt lives with grandmother; has used heroin since age 13, currently IV route with last use of one bundle last night  Pt has been through drug rehab once one year ago, denies psych inpatient admissions in past  Also states uses methamphetamines when heroin is unavailable  On approach, pt appears depressed, withdrawn, flat and constricted with concrete responses  States "my life is a joke" in response to what brought pt into psych hospital floor  No current signs of withdrawal, but suspects will experience shortly  Denies SI, HI and A/V hallucinations  Able to contract for safety

## 2019-12-19 NOTE — TREATMENT PLAN
TREATMENT PLAN REVIEW - [address] 34 y o  1990 male MRN: [id number]    [address] [address] Room / Bed: [facility] Encounter: [id number]        Admit Date/Time:  12/18/2019  8:17 PM    Treatment Team: Attending Provider: Kerrie Kelsey MD; Consulting Physician: ERIC Gonzalez; Patient Care Technician: Zak Jauregui; Registered Nurse: Carly Rodríguez, RN; Registered Nurse: Kaveh Shahid, RN; Nurse Practitioner: Keely Carranza; Registered Nurse: Judith Lizarraga, FRANCISCO; Patient Care Assistant: Armando Russell; Patient Care Assistant: Ryann Mark;  Patient Care Assistant: Nakul Lebron    Diagnosis: Principal Problem:    Major depressive disorder, recurrent severe without psychotic features ([facility] )  Active Problems:    Opioid type dependence, continuous ([facility] )    Methamphetamine abuse ([facility] )    Elevated liver enzymes    Medical clearance for psychiatric admission    Low TSH level    Patient Strengths/Assets: negotiates basic needs, patient is on a voluntary commitment, stable housing, supportive grandmother      Patient Barriers/Limitations: substance abuse    Short Term Goals: decrease in depressive symptoms, decrease in anxiety symptoms, decrease in suicidal thoughts, control of withdrawal symptoms    Long Term Goals: improvement in anxiety, resolution of depressive symptoms, free of suicidal thoughts, resolution of withdrawal symptoms    Progress Towards Goals: starting psychiatric medications as prescribed, starting opioid detoxification protocol    Recommended Treatment: medication management, patient medication education, group therapy, milieu therapy, continued Behavioral Health psychiatric evaluation/assessment process     Treatment Frequency: daily medication monitoring, group and milieu therapy daily, monitoring through interdisciplinary rounds, monitoring through weekly patient care conferences    Expected Discharge Date: 5 days - 12/24/2019    Discharge Plan: referral for outpatient medication management with a psychiatrist, referral to inpatient drug and alcohol rehabilitation program    Treatment Plan Created/Updated By: Anjel Maradiaga MD

## 2019-12-19 NOTE — PROGRESS NOTES
Pt received prn bentyl and robaxin for withdrawal symptoms of stomach discomfort and muscle spasms in legs

## 2019-12-19 NOTE — PLAN OF CARE
Problem: Risk for Self Injury/Neglect  Goal: Treatment Goal: Remain safe during length of stay, learn and adopt new coping skills, and be free of self-injurious ideation, impulses and acts at the time of discharge  Outcome: Progressing  Goal: Refrain from harming self  Description  Interventions:  - Monitor patient closely, per order  - Develop a trusting relationship  - Supervise medication ingestion, monitor effects and side effects   Outcome: Progressing     Problem: DISCHARGE PLANNING - CARE MANAGEMENT  Goal: Discharge to post-acute care or home with appropriate resources  Description  INTERVENTIONS:  - Conduct assessment to determine patient/family and health care team treatment goals, and need for post-acute services based on payer coverage, community resources, and patient preferences, and barriers to discharge  - Address psychosocial, clinical, and financial barriers to discharge as identified in assessment in conjunction with the patient/family and health care team  - Arrange appropriate level of post-acute services according to patients   needs and preference and payer coverage in collaboration with the physician and health care team  - Communicate with and update the patient/family, physician, and health care team regarding progress on the discharge plan  - Arrange appropriate transportation to post-acute venues  Outcome: Progressing     Problem: SELF HARM/SUICIDALITY  Goal: Will have no self-injury during hospital stay  Description  INTERVENTIONS:  - Q 15 MINUTES: Routine safety checks  - Q WAKING SHIFT & PRN: Assess risk to determine if routine checks are adequate to maintain patient safety  - Encourage patient to participate actively in care by formulating a plan to combat response to suicidal ideation, identify supports and resources  Outcome: Progressing

## 2019-12-19 NOTE — PROGRESS NOTES
Pt presents as visible in public areas as well as bedroom  Currently denies SI, HI and A/V hallucinations  Compliant with meds  Observed as calm, quiet and cooperative  Appears constricted and flat with concrete responses, but brighter on approach   Reports "the plan is for me to go to rehab, which I think is good because I think I belong there "

## 2019-12-19 NOTE — QUICK NOTE
Psychiatric Evaluation - Behavioral Health   Fernando Johnson 34 y o  male MRN: [id number]  Unit/Bed#: [facility] 350-01 Encounter: [id number]    Assessment/Plan   Principal Problem:    Major depressive disorder, recurrent severe without psychotic features ([facility] )  Active Problems:    Opioid type dependence, continuous ([facility] )    Methamphetamine abuse ([facility] )    Plan:   Risks, benefits and possible side effects of Medications:   Risks, benefits, and possible side effects of medications explained to patient and patient verbalizes understanding  1  Suicidal potential, possible MDD      - Zoloft 25 mg today, then 50 mg daily starting tomorrow      - Monitor depressive symptoms    2  Opioid use disorder      - Clonidine 0 1 mg tid and gabapentin 100 mg tid      - Discharge to inpatient rehab      - Hepatitis and HIV tests ordered       Chief Complaint: Suicidal ideation    History of Present Illness     Patient is a 34 y o  male with a history of opioid use disorder presents with a Signs of suicidal potential   Patient was admitted to psychiatric unit on a voluntarily 201 commitment basis  Primary complaints include: hopelessness, low energy, depression, and opioid withdrawal symptoms  - Withdrawal symptoms include diarrhea, abdominal cramps, restless legs, sneezing, and insomnia  Onset of symptoms was abrupt starting 1 day ago with rapidly worsening course since that time  Psychosocial Stressors: The holidays, which remind him of his parents' deaths        Psychiatric Review Of Systems:  sleep: insomnia  appetite changes: yes, decreased  weight changes: no  energy/anergy: yes, low energy  interest/pleasure/anhedonia: Denies anhedonia  somatic symptoms: no  anxiety/panic: yes  enmanuel: no  guilty/hopeless: yes  self injurious behavior/risky behavior: not at this time    Historical Information     Past Psychiatric History:   None    Substance Abuse History:  Social History     Tobacco History     Smoking Status  Current Every Day Smoker Smoking Frequency  0 5 packs/day Smoking Tobacco Type  Cigarettes    Smokeless Tobacco Use  Former User          Alcohol History     Alcohol Use Status  Never Comment  occ          Drug Use     Drug Use Status  Yes Types  Heroin Frequency   7 times/week Comment  daily          Sexual Activity     Sexually Active  Never          Activities of Daily Living    Not Asked               Additional Substance Use Detail     Questions Responses    Substance Use Assessment Substance use within the past 12 months    Alcohol Use Frequency Denies use in past 12 months    Cannabis frequency Never used    Comment: Never used on 12/18/2019     Heroin Frequency 1 or 2 times/week    Heroin Method IV    Heroin 1st Use At age 13    Heroin Last Use & Amount 12/8, 1 bundle    Cocaine frequency Never used    Comment: Never used on 12/18/2019     Crack Cocaine Frequency Denies use in past 12 months    Methamphetamine Frequency Past abuse    Narcotic Frequency Denies use in past 12 months    Benzodiazepine Frequency Denies use in past 12 months    Amphetamine frequency Denies use in past 12 months    Barbituate Frequency Denies use use in past 12 months    Inhalant frequency Never used    Comment: Never used on 12/18/2019     Hallucinogen frequency Never used    Comment: Never used on 12/18/2019     Ecstasy frequency Never used    Comment: Never used on 12/18/2019     Other drug frequency Never used    Comment: Never used on 12/18/2019     Opiate frequency Denies use in past 12 months    Last reviewed by Angie Givens RN on 12/18/2019        I have assessed this patient for substance use within the past 12 months    Family Psychiatric History:   None    Social History:  Education: high school diploma/GED  Learning Disabilities: ADHD, trial of adderall at age 10  Marital history: single  Living arrangement, social support: The patient lives in home with his grandmother    Occupational History: unemployed, previously worked in [employer]  Functioning Relationships: good support system  Other Pertinent History: Legal: probation        Traumatic History:   Abuse: None  Other Traumatic Events: None    Past Medical History:   Diagnosis Date    Addiction to drug ([facility] )     Depression     Head injury     Panic attack     Substance abuse ([facility] )        Medical Review Of Systems:  Pertinent items are noted in HPI  Meds/Allergies   all current active meds have been reviewed  No Known Allergies    Objective   Vital signs in last 24 hours:  Temp:  [98 3 °F (36 8 °C)-99 5 °F (37 5 °C)] 99 °F (37 2 °C)  HR:  [55-90] 55  Resp:  [16] 16  BP: (100-140)/(57-68) 100/59    No intake or output data in the 24 hours ending 12/19/19 1232    Mental Status Evaluation:  Appearance:  casually dressed   Behavior:  restless and fidgety   Speech:  normal pitch and normal volume   Mood:  anxious   Affect:  mood-congruent   Language: Language intact   Thought Process:  normal   Thought Content:  No delusions or paranoid thoughts   Perceptual Disturbances: Denies AH/VH   Risk Potential: Suicidal Ideations none at this time, Homicidal Ideations none and Potential for Aggression No   Sensorium:  person, place, time/date and situation   Cognition:  grossly intact   Consciousness:  alert and awake    Attention: attention span and concentration were age appropriate   Intellect: within normal limits   Fund of Knowledge: Normal fund of knowledge   Insight:  good, patient understands his illness   Judgment: good, patient wishes to work with treatment team   Muscle Strength and Tone: Grossly intact   Gait/Station: normal gait/station and normal balance   Motor Activity: no abnormal movements     Code Status: Level 1 - Full Code  Advance Directive and Living Will:      Power of :    POLST:        Patient Strengths/Assets: Patient has good insight and is cooperating with the treatment team  He wishes to go to outpatient rehab on discharge   He has good support from his grandmother and uncle      Patient Barriers/Limitations: Patient does not have insurance    [address] Student

## 2019-12-19 NOTE — DISCHARGE INSTR - OTHER ORDERS
[address] [facility]  [phone]    Crisis Text Line : 693796    National Suicide Hotline : 1 669.351.4946    The [facility] Family-to-Family Education Program is a free 12-week (2 1/2 hours/week) course for families of individuals with severe brain disorders (mental illnesses)  The classes are taught by trained family members  All course materials are furnished at no cost to you  Below are some details  To register, e-mail [email or web address] or call [phone]  The curriculum focuses on schizophrenia, bipolar disorder (manic depression), clinical depression, panic disorders and obsessive-compulsive disorder (OCD)  The course discusses the clinical treatment of these illnesses and teaches the knowledge and skills that family members need to cope more effectively  Topics Include:   Learning about feelings, learning about facts    Schizophrenia, major depression and enmanuel: diagnosis and dealing with critical periods    Subtypes of depression and bipolar disorder, panic disorder and OCD; diagnosis and causes; sharing our stories    The biology of the brain/new research    Problem solving workshops    Medication review    Empathy workshop  what its like to have a brain disorder    Communication skills workshop    Self-care and relative groups   [facility], services available    Advocacy: fighting stigma    Review and certification ceremony    [id number] Education Course  The [employer] Education class is a ten week  two hours per week  experiential education course on the topic of recovery for any person with serious mental illness who is interested in establishing and maintaining wellness  The course uses a combination of lecture, interactive exercises, and structural group processes  The diversity of experience among participants affords for a lively dynamic that moves the course along    [facility] Inxx-qv-Zfpj Education class is offered free of charge to people who experience mental illness  You do not need to be a member of [facility] to take the course  Courses are taught by teams of trained mentors/peer-teachers who are themselves experienced at living well with mental illness  Below are some details  To register, call [phone] or e-mail [email or web address]  Sign up today! [facility] group is for family members, caregivers, and loved ones of individuals living with the everyday challenges of mental illness  The leaders are family members in the same situation  Sessions take place in an intimate, confidential setting to allow families to share openly with each other  These support groups allow participants to learn from the experiences of other group members, share coping strategies, and offer each other encouragement and understanding  Tierra Pollock know that you are not alone  Drop inno registration is necessary  Here are the times and locations  [email or web address]  Monthly: 3rd Monday, 7:00-8:30 pm  [facility] [place]  Monthly: 4th Tuesday, 7:00-8:30 pm  [facility]  Monthly: 1st Monday, 7:00-8:30 pm  [address]         Monthly Support for Persons with Mental Illness  The Peer Support Group is a monthly meeting for individuals facing the challenges of recovering from severe and persistent mental illness  Depression, manic depression, schizophrenia, and general anxiety disorder are only a few of the diagnoses of individuals who have found a supportive place at our meetings  Our [place]  We are a fellowship of individuals who share a common goal of recovery and the ability to maintain mental and emotional stability  We help others and ourselves through sharing our experiences, strength and hope with each other   No matter how traumatic our past or how despairing our present may be, there is hope for a new day  Sessions take place in an intimate, confidential setting to allow individuals to share openly with each other  Samantha Meza know that you are not alone  Drop inno registration is necessary  Here are the times and locations    RUSSELL  Monthly: 1st Monday, 7:00-8:30 pm  [facility]  [address] [address] [place], [place]   [email or web address]  Monthly: 3rd Monday, 7:00-8:30 pm  [address]  [address] [facility]

## 2019-12-19 NOTE — PROGRESS NOTES
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

## 2019-12-19 NOTE — PLAN OF CARE
Problem: Risk for Self Injury/Neglect  Goal: Treatment Goal: Remain safe during length of stay, learn and adopt new coping skills, and be free of self-injurious ideation, impulses and acts at the time of discharge  Outcome: Not Progressing  Goal: Refrain from harming self  Description  Interventions:  - Monitor patient closely, per order  - Develop a trusting relationship  - Supervise medication ingestion, monitor effects and side effects   Outcome: Not Progressing     Problem: SUBSTANCE USE/ABUSE  Goal: By discharge, will develop insight into their chemical dependency and sustain motivation to continue in recovery  Description  INTERVENTIONS:  - Attends all daily group sessions and scheduled AA groups  - Actively practices coping skills through participation in the therapeutic community and adherence to program rules  - Reviews and completes assignments from individual treatment plan  - Assist patient development of understanding of their personal cycle of addiction and relapse triggers  Outcome: Not Progressing  Goal: By discharge, patient will have ongoing treatment plan addressing chemical dependency  Description  INTERVENTIONS:  - Assist patient with resources and/or appointments for ongoing recovery based living  Outcome: Not Progressing

## 2019-12-19 NOTE — H&P
Psychiatric Evaluation - Behavioral Health     Identification Data:Mahesh Wagoner 34 y o  male MRN: [id number]  Unit/Bed#: [facility] 350-01 Encounter: [id number]    Chief Complaint: depression, anxiety, suicidal ideation and opioid use    History of Present Illness     Jalen Casey is a 34 y o  male with a history of substance use and no past history of psychiatric illness who was admitted to the inpatient psychiatric unit on a voluntary 201 commitment basis due to depression, anxiety, substance abuse and suicidal ideation without plan  Symptoms prior to admission included feeling depressed, suicidal ideation, hopelessness, helplessness, poor concentration, difficulty sleeping, anxiety symptoms, drug abuse and difficulty attending to activities of daily living  Onset of symptoms was gradual starting few weeks ago with progressively worsening course since that time  Stressors preceding admission included drug use problems and anniversary of mother's death  Bianka Trinh presented to ED reporting depressive symptoms, suicidal ideation without a plan and opioid withdrawal  He was frustrated with upcoming anniversary of his mother's death, upcoming Holidays and difficulty to stop his heroin use  He felt that he was "tired of living that way" and decide to get psychiatric help  On initial evaluation after admission to the inpatient psychiatric unit Bianka Trinh was still feeling depressed and anxious  He reported significant opioid withdrawal symptoms and appeared to be in distress due to withdrawal  He agreed to start treatment with an antidepressant to help with depressive symptoms and was also motivated for inpatient Drug and Alcohol rehabilitation treatment once mood was more stable      Psychiatric Review Of Systems:    Sleep changes: yes, decreased  Appetite changes: no  Weight changes: no  Energy/anergy: yes, decreased  Interest/pleasure/anhedonia: yes, decreased  Somatic symptoms: yes  Anxiety/panic: yes  Sophy: no  Guilty/hopeless: yes  Self injurious behavior/risky behavior: no  Suicidal ideation: yes, no plan  Homicidal ideation: no  Auditory hallucinations: no  Visual hallucinations: no  Other hallucinations: no  Delusional thinking: no  Eating disorder history: no  Obsessive/compulsive symptoms: no    Historical Information     Past Psychiatric History:     Past Inpatient Psychiatric Treatment:   No history of past inpatient psychiatric admissions  Past Outpatient Psychiatric Treatment:    Was in psychiatric treatment in the past with a therapist while in drug and alcohol rehabilitation facility  Past Suicide Attempts: no  Past Violent Behavior: no  Past Psychiatric Medication Trials:  Adderall     Substance Abuse History:    Social History     Tobacco History     Smoking Status  Current Every Day Smoker Smoking Frequency  0 5 packs/day Smoking Tobacco Type  Cigarettes    Smokeless Tobacco Use  Former User          Alcohol History     Alcohol Use Status  Never Comment  occ          Drug Use     Drug Use Status  Yes Types  Heroin Frequency   7 times/week Comment  daily          Sexual Activity     Sexually Active  Never          Activities of Daily Living    Not Asked               Additional Substance Use Detail     Questions Responses    Substance Use Assessment Denies substance use within the past 12 months    Alcohol Use Frequency Denies use in past 12 months    Cannabis frequency Never used    Comment: Never used on 12/18/2019     Heroin Frequency Past abuse    Cocaine frequency Never used    Comment: Never used on 12/18/2019     Crack Cocaine Frequency Denies use in past 12 months    Methamphetamine Frequency Denies use in past 12 months    Narcotic Frequency Denies use in past 12 months    Benzodiazepine Frequency Denies use in past 12 months    Amphetamine frequency Denies use in past 12 months    Barbiturate Frequency Denies use in past 12 months    Inhalant frequency Never used    Comment: Never used on 12/18/2019     Hallucinogen frequency Never used    Comment: Never used on 12/18/2019     Ecstasy frequency Never used    Comment: Never used on 12/18/2019     Other drug frequency Never used    Comment: Never used on 12/18/2019     Opiate frequency Denies use in past 12 months    Last reviewed by Matheus Blanco RN on 12/18/2019        I have assessed this patient for substance use within the past 12 months    Alcohol use: denies use  Recreational drug use:   Cocaine:  denies use  Heroin:  current use, uses daily, route: intravenous, approximately 10 bags per day, for 14 years, last use was 2 days ago, withdrawal symptoms: yes (diarrhea, restless legs, runny nose)  Marijuana:  denies use  Other drugs: Methamphetamines: current use, uses 1 time per month, approximately $ 30 worth, for 3 months, last use was 1 month ago   Longest clean time: 28 days  History of Inpatient/Outpatient rehabilitation program: Yes, 1 time, at [facility]  Smoking history: 1/2 pack per day  Use of caffeine: 1 cup of coffee per day    Family Psychiatric History:     Psychiatric Illness:  no family history of psychiatric illness  Substance Abuse:  no family history of substance abuse  Suicide Attempts:  no family history of suicide attempts    Social History:    Education: 12th grade  Learning Disabilities: ADHD history  Marital History: single  Children: none  Living Arrangement: lives in home with grandmother  Occupational History: worked in [employer] in the past, currently unemployed  Functioning Relationships: grandmother is supportive  Legal History: past arrest due to retail theft, on probation until 2021   History: None    Traumatic History:     Abuse: none  Other Traumatic Events: none     Past Medical History:    History of Seizures: no  History of Head injury with loss of consciousness: yes, history of head injury    Past Medical History:   Diagnosis Date    [facility] [address] [facility]     Depression     Head injury  Panic attack     Substance abuse ([facility] [address] )      Past Surgical History:   Procedure Laterality Date    INCISION AND DRAINAGE OF WOUND Left 7/16/2019    Procedure: INCISION AND DRAINAGE (I&D) EXTREMITY;  Surgeon: Mary Harp MD;  Location: [facility];  Service: General    NEPHROSTOMY      since removed    PARATHYROID GLAND SURGERY      TUMOR REMOVAL      parathyroid gland       Medical Review Of Systems:    A comprehensive review of systems was negative except for: Ears, nose, mouth, throat, and face: positive for nasal congestion  Gastrointestinal: positive for abdominal cramps and diarrhea  Musculoskeletal: positive for muscle aches  Behavioral/Psych: positive for anxiety, depression, hopelessness, opioid withdrawal symptoms and sleep disturbance    Allergies:    No Known Allergies    Medications: All current active medications have been reviewed  Medications prior to admission:    Prior to Admission Medications   Prescriptions Last Dose Informant Patient Reported?  Taking?   acetaminophen (TYLENOL) 650 mg CR tablet Not Taking at Unknown time  No No   Sig: Take 1 tablet (650 mg total) by mouth every 8 (eight) hours as needed for mild pain   Patient not taking: Reported on 12/18/2019   neomycin-polymyxin-hydrocortisone (CORTISPORIN) 0 35%-10,000 units/mL-1% otic suspension   No No   Sig: Administer 4 drops into ears every 6 (six) hours for 7 days      Facility-Administered Medications: None       OBJECTIVE:    Vital signs in last 24 hours:    Temp:  [98 3 °F (36 8 °C)-99 5 °F (37 5 °C)] 99 °F (37 2 °C)  HR:  [55-90] 55  Resp:  [16] 16  BP: (100-140)/(57-68) 100/59    No intake or output data in the 24 hours ending 12/19/19 1137     Mental Status Evaluation:    Appearance:  disheveled   Behavior:  cooperative, limited eye contact   Speech:  scant, soft   Mood:  depressed, anxious   Affect:  blunted   Language: naming objects and repeating phrases   Thought Process:  organized, goal directed   Associations: intact associations   Thought Content:  no overt delusions   Perceptual Disturbances: no auditory hallucinations, no visual hallucinations   Risk Potential: Suicidal ideation - Yes, without plan, contracts for safety on the unit  Homicidal ideation - None  Potential for aggression - No   Sensorium:  oriented to person, place and time/date   Memory:  recent and remote memory grossly intact   Consciousness:  alert and awake   Attention: decreased concentration and decreased attention span   Intellect: average   Fund of Knowledge: awareness of current events: yes  past history: yes  vocabulary: normal   Insight:  impaired   Judgment: impaired   Muscle Strength Muscle Tone: normal  normal   Gait/Station: normal gait/station, normal balance   Motor Activity: no abnormal movements       Laboratory Results: I have personally reviewed all pertinent laboratory/tests results      Admission Labs:   Admission on 12/18/2019   Component Date Value    RPR 12/18/2019 Non-Reactive     TSH 3RD GENERATON 12/18/2019 0 333*    WBC 12/19/2019 7 10     RBC 12/19/2019 4 66     Hemoglobin 12/19/2019 14 5     Hematocrit 12/19/2019 41 2     MCV 12/19/2019 88     MCH 12/19/2019 31 0     MCHC 12/19/2019 35 1     RDW 12/19/2019 13 8     MPV 12/19/2019 9 3     Platelets 47/83/7690 192     Neutrophils Relative 12/19/2019 55     Lymphocytes Relative 12/19/2019 33     Monocytes Relative 12/19/2019 10     Eosinophils Relative 12/19/2019 2     Basophils Relative 12/19/2019 1     Neutrophils Absolute 12/19/2019 3 90     Lymphocytes Absolute 12/19/2019 2 40     Monocytes Absolute 12/19/2019 0 70     Eosinophils Absolute 12/19/2019 0 10     Basophils Absolute 12/19/2019 0 00     Sodium 12/19/2019 137     Potassium 12/19/2019 3 9     Chloride 12/19/2019 105     CO2 12/19/2019 27     ANION GAP 12/19/2019 5     BUN 12/19/2019 13     Creatinine 12/19/2019 0 47*    Glucose 12/19/2019 105*    Glucose, Fasting 12/19/2019 105*    Calcium 12/19/2019 9 4     AST 12/19/2019 173*    ALT 12/19/2019 339*    Alkaline Phosphatase 12/19/2019 83     Total Protein 12/19/2019 7 0     Albumin 12/19/2019 3 9     Total Bilirubin 12/19/2019 0 70     eGFR 12/19/2019 150     Cholesterol 12/19/2019 107     Triglycerides 12/19/2019 49     HDL, Direct 12/19/2019 36*    LDL Calculated 12/19/2019 61     Non-HDL-Chol (CHOL-HDL) 12/19/2019 71    Drug Screen:   Lab Results   Component Value Date    AMPMETHUR Negative 12/18/2019    BARBTUR Positive (A) 12/18/2019    BDZUR Negative 12/18/2019    THCUR Negative 12/18/2019    COCAINEUR Negative 12/18/2019    METHADONEUR Negative 12/18/2019    OPIATEUR Positive (A) 12/18/2019    PCPUR Negative 12/18/2019       Imaging Studies: No results found      Code Status: Level 1 - Full Code  Advance Directive and Living Will: <no information>    Suicide/Homicide Risk Assessment:    Risk of Harm to Self:   Demographic risk factors include: , never , male  Historical Risk Factors include: drug use  Current Specific Risk Factors include: has suicidal ideation without a plan, current depressive symptoms  Protective Factors: ability to communicate with staff on the unit, able to contract for safety on the unit, taking medications as ordered on the unit, stable housing  Based on today's assessment, Derek Branham presents the following risk of harm to self: moderate    Risk of Harm to Others:  Based on today's assessment, Derek Branham presents the following risk of harm to others: none    The following interventions are recommended: behavioral checks every 7 minutes, continued hospitalization on locked unit     Assessment/Plan   Principal Problem:    Major depressive disorder, recurrent severe without psychotic features ([facility] [address] )  Active Problems:    Opioid type dependence, continuous ([facility] [address] )    Methamphetamine abuse ([facility] [address] )    Elevated liver enzymes    Medical clearance for psychiatric admission    Low TSH level    Patient Strengths: negotiates basic needs, patient is on a voluntary commitment, stable housing, supportive grandmother     Patient Barriers: substance abuse    Treatment Plan:     Planned Treatment and Medication Changes: All current active medications have been reviewed  Encourage group therapy, milieu therapy and occupational therapy  Behavioral Health checks every 7 minutes  Plan for inpatient Drug and Alcohol rehabilitation placement when psychiatrically stable  Start Zoloft 25 mg daily and titrate dose to 50 mg daily to help with depressive symptoms  Start Neurontin 100 mg tid to help with anxiety symptoms  Add Clonidine 0 1 mg tid to help with opioid withdrawal symptoms  Taper off gradually  PRN Bentyl, Imodium and Robaxin to help with withdrawal  Check hepatitis panel and HIV due to IV drug use  Liver enzymes are elevated      Current medications:    Current Facility-Administered Medications:  acetaminophen 650 mg Oral Q6H PRN Ye Masterson MD   aluminum-magnesium hydroxide-simethicone 30 mL Oral Q4H PRN Elizabeth Oh MD   benztropine 1 mg Intramuscular Q6H PRN Ye Masterson MD   benztropine 1 mg Oral Q6H PRN Ye Masterson MD   cloNIDine 0 1 mg Oral Q4H PRN Elizabeth Oh MD   cloNIDine 0 1 mg Oral TID Ye Masterson MD   dicyclomine 20 mg Oral Q6H PRN Elizabeth Oh MD   gabapentin 100 mg Oral TID Ye Masterson MD   haloperidol 5 mg Oral Q6H PRN Ye Masterson MD   haloperidol lactate 5 mg Intramuscular Q6H PRN Ye Masterson MD   hydrOXYzine HCL 25 mg Oral Q6H PRN Elizabeth Oh MD   hydrOXYzine HCL 50 mg Oral Q6H PRN Ye Masterson MD   hydrOXYzine HCL 75 mg Oral Q6H PRN Ye Masterson MD   ibuprofen 600 mg Oral Q8H PRYUMI Oh MD   ibuprofen 800 mg Oral Q8H PRN Elizabeth Oh MD   influenza vaccine 0 5 mL Intramuscular Once Hardeep Arevalo MD   loperamide 2 mg Oral Q4H PRN Ye Masterson MD   LORazepam 1 mg Intramuscular Q6H PRN Ye Masterson MD   magnesium hydroxide 30 mL Oral Daily PRN Klaudia Trinh MD   methocarbamol 500 mg Oral Q6H PRN Klaudia Trinh MD   multivitamin-minerals 1 tablet Oral Daily William Low MD   OLANZapine 10 mg Oral Q3H PRN William Low MD   OLANZapine 10 mg Intramuscular Q3H PRN William Low MD   risperiDONE 1 mg Oral Q6H PRN MD Vahe Gupta ON 12/20/2019] sertraline 50 mg Oral Daily William Low MD   traZODone 50 mg Oral HS PRN Klaudia Trinh MD       Risks / Benefits of Treatment:    Risks, benefits, and possible side effects of medications explained to patient including risk of suicidality and serotonin syndrome related to treatment with antidepressants  The patient verbalizes understanding and agreement for treatment  Counseling / Coordination of Care:    Patient's presentation on admission and proposed treatment plan discussed with treatment team   Diagnosis, medication changes and treatment plan reviewed with patient  Stressors preceding admission discussed with patient including drug use problems and anniversary of mother's death  Events leading to admission reviewed with patient  Discussed with patient plan for opioid detoxification protocol and gradual taper of medications to prevent withdrawal symptoms  Inpatient Psychiatric Certification:    Estimated length of stay: 5 midnights    Based upon physical, mental and social evaluations, I certify that inpatient psychiatric services are medically necessary for this patient for a duration of 5 midnights for the treatment of Major depressive disorder, recurrent severe without psychotic features ([facility] [address] )  Available alternative community resources do not meet the patient's mental health care needs  I further attest that an established written individualized plan of care has been implemented and is outlined in the patient's medical records    The patient has been released from the Emergency Department and medically cleared as per Emergency Department documentation for psychiatric admission for Major depressive disorder, recurrent severe without psychotic features ([facility] [address] )      Marsha Glass MD 12/19/19

## 2019-12-19 NOTE — ASSESSMENT & PLAN NOTE
Patient noted to have an elevated AST at 173, and ALT at 3:39 a m , will continue to monitor, will get repeat CMP on Monday, will also order hepatitis panel  as patient reports IV drug abuse

## 2019-12-19 NOTE — CASE MANAGEMENT
Patient admitted to [facility] on 12/18 at 2017 on a 201 from [facility]  Patient came to ER with complaints of increased depression, suicidal ideation, and substance abuse  Patient states he is a long time heroine user, since age 13  Patient UDS positive for Opiates and Barbiturates  Patient unable to identify substance used to have +Barbiturates  Patient alert and orientated at time of assessment  Pleasant and cooperative with this CM  Currently rates depression as 5/10, anxiety 0/10, and withdrawal symptoms as "12"/10  Patient asked if he currently needed any medications for withdrawals, responded he just received PRN medications less than 1 hour ago  Patient stressors at current time are: Mother passed away 4 years ago around this time of year; Substance Abuse; and current family drug abuse (brother)  Patient strengths are: Good Support system, identifies Grandmother Eric Louis), brother Angelica Ayoub), and [address] [facility]  Motivated for treatment  Patient weaknesses are: No current income  No current outpatient providers (ICM, PCP, or Psych)  No current insurance  Patient denies any current legal issues  Is currently on probation for retail theft (+2 years ago)  Patient denies any access to firearms  Patient currently has no aftercare providers or appointments scheduled  Patient is currently not on any medications  Patient requesting rehab  Patient spoke of [facility] in [facility]  [facility] is located in [place], [place] miles from [facility]  Phone number is [phone]  Patient signed DEVI for Grandmother (Norman David)

## 2019-12-19 NOTE — CONSULTS
Consult- Travis Youssef 1990, 34 y o  male MRN: [id number]    Unit/Bed#: [facility] 350-01 Encounter: [id number]    Primary Care Provider: No primary care provider on file  Date and time admitted to hospital: 12/18/2019  8:17 PM      Inpatient consult for Medical Clearance for [facility] patient  Consult performed by: Katarzyna Swan  Consult ordered by: Teresa Poon MD          Low TSH level  Assessment & Plan    Patient noted to have a TSH of 0 33, will order T3 and T4  Medical clearance for psychiatric admission  Assessment & Plan    Patient medically cleared for psychiatric admission, EKG reviewed , avoid QT prolonging medications, normal sinus rhythm  We will now sign off please feel free to call if further assistance is needed form internal medicine  Elevated liver enzymes  Assessment & Plan   Patient noted to have an elevated AST at 173, and ALT at 3:39 a m , will continue to monitor, will get repeat CMP on Monday, will also order hepatitis panel  as patient reports IV drug abuse  Methamphetamine abuse ([address] )  Assessment & Plan   As per psych treatment team    Opioid type dependence, continuous ([address] )  Assessment & Plan   As per psych treatment    * Major depressive disorder, recurrent severe without psychotic features [facility])  Assessment & Plan   As per psych treatment team    VTE Prophylaxis:   Patient ambulatory, encouraged ambulation    Recommendations for Discharge:  ·  as per treatment team      History of Present Illness:    Travis Youssef is a 34 y o  male who is originally admitted to the psychiatry service on 12/18/2019 as a 12,  Patient reported to the emergency department with increased thoughts of suicidal intention with overdose on drugs  Patient reports that the holidays are a trigger for him as his mother had passed away "in his arms "for years ago  Patient currently lives with his grandmother, he states he has been using heroin since age 13, current route is IV  Patient reports that he has been through drug rehab once a year ago  Patient also reports that he uses methamphetamines when heroin is unavailable  We are consulted for medical clearance  Review of Systems:    Review of Systems   Constitutional: Negative for chills and fatigue  Respiratory: Negative for chest tightness, shortness of breath and wheezing  Cardiovascular: Negative for chest pain, palpitations and leg swelling  Gastrointestinal: Negative for blood in stool, constipation, diarrhea, nausea and vomiting  Genitourinary: Negative for dysuria and hematuria  Neurological: Negative for dizziness, numbness and headaches         Past Medical and Surgical History:     Past Medical History:   Diagnosis Date    Addiction to drug ([facility] )     Depression     Head injury     Panic attack     Substance abuse ([facility] )        Past Surgical History:   Procedure Laterality Date    INCISION AND DRAINAGE OF WOUND Left 7/16/2019    Procedure: INCISION AND DRAINAGE (I&D) EXTREMITY;  Surgeon: Rebekah Melchor MD;  Location: [facility];  Service: General    NEPHROSTOMY      since removed    PARATHYROID GLAND SURGERY      TUMOR REMOVAL      parathyroid gland       Meds/Allergies:    all medications and allergies reviewed    Allergies: No Known Allergies    Social History:     Marital Status: Single    Substance Use History:   Social History     Substance and Sexual Activity   Alcohol Use Never    Frequency: Never    Binge frequency: Never    Comment: occ     Social History     Tobacco Use   Smoking Status Current Every Day Smoker    Packs/day: 0 50    Types: Cigarettes   Smokeless Tobacco Former User     Social History     Substance and Sexual Activity   Drug Use Yes    Frequency: 7 0 times per week    Types: Heroin    Comment: daily       Family History:    Family History   Family history unknown: Yes       Physical Exam:     Vitals:   Blood Pressure: 100/59 (12/19/19 1059)  Pulse: 55 (12/19/19 1059)  Temperature: 99 °F (37 2 °C) (12/19/19 1059)  Temp Source: Temporal (12/19/19 1059)  Respirations: 16 (12/19/19 1059)  Height: 5' 8" (172 7 cm) (12/18/19 2025)  Weight - Scale: 68 6 kg (151 lb 3 2 oz) (12/18/19 2025)  SpO2: 98 % (12/18/19 2025)    Physical Exam   Constitutional: He is oriented to person, place, and time  He appears well-developed and well-nourished  No distress  Patient reports that he is currently going through withdrawal   HENT:   Head: Normocephalic and atraumatic  Right Ear: External ear normal    Left Ear: External ear normal    Nose: Nose normal    Mouth/Throat: Oropharynx is clear and moist  No oropharyngeal exudate  Eyes: Pupils are equal, round, and reactive to light  Conjunctivae and EOM are normal  Right eye exhibits no discharge  Left eye exhibits no discharge  Neck: Normal range of motion  Neck supple  No thyromegaly present  Cardiovascular: Normal rate, regular rhythm, normal heart sounds and intact distal pulses  Exam reveals no gallop and no friction rub  No murmur heard  Pulmonary/Chest: Effort normal and breath sounds normal  No stridor  No respiratory distress  He has no wheezes  He has no rales  Abdominal: Soft  Bowel sounds are normal  He exhibits no distension  There is no tenderness  Lymphadenopathy:     He has no cervical adenopathy  Neurological: He is alert and oriented to person, place, and time  Skin: Skin is warm and dry  He is not diaphoretic  Multiple scars noted to patient's bilateral upper extremities         Additional Data:     Lab Results: I have personally reviewed pertinent reports        Results from last 7 days   Lab Units 12/19/19  0611   WBC Thousand/uL 7 10   HEMOGLOBIN g/dL 14 5   HEMATOCRIT % 41 2   PLATELETS Thousands/uL 192   NEUTROS PCT % 55   LYMPHS PCT % 33   MONOS PCT % 10   EOS PCT % 2     Results from last 7 days   Lab Units 12/19/19  0611   POTASSIUM mmol/L 3 9   CHLORIDE mmol/L 105   CO2 mmol/L 27   BUN mg/dL 13 CREATININE mg/dL 0 47*   CALCIUM mg/dL 9 4   ALK PHOS U/L 83   ALT U/L 339*   AST U/L 173*           Imaging: I have personally reviewed pertinent reports  No results found  EKG, Pathology, and Other Studies Reviewed on Admission:   · EKG: NSS,     M*Modal software was used to dictate this note  It may contain errors with dictating incorrect words or incorrect spelling  Please contact the provider directly with any questions

## 2019-12-20 LAB
EST. AVERAGE GLUCOSE BLD GHB EST-MCNC: 88 MG/DL
HBA1C MFR BLD: 4.7 % (ref 4.2–6.3)
T3FREE SERPL-MCNC: 2.1 PG/ML (ref 2.3–4.2)
T4 FREE SERPL-MCNC: 1.3 NG/DL (ref 0.76–1.46)

## 2019-12-20 PROCEDURE — 90471 IMMUNIZATION ADMIN: CPT | Performed by: PSYCHIATRY & NEUROLOGY

## 2019-12-20 PROCEDURE — 90686 IIV4 VACC NO PRSV 0.5 ML IM: CPT | Performed by: PSYCHIATRY & NEUROLOGY

## 2019-12-20 PROCEDURE — 99232 SBSQ HOSP IP/OBS MODERATE 35: CPT | Performed by: PSYCHIATRY & NEUROLOGY

## 2019-12-20 PROCEDURE — 84439 ASSAY OF FREE THYROXINE: CPT | Performed by: PSYCHIATRY & NEUROLOGY

## 2019-12-20 PROCEDURE — 87389 HIV-1 AG W/HIV-1&-2 AB AG IA: CPT | Performed by: PSYCHIATRY & NEUROLOGY

## 2019-12-20 PROCEDURE — 84481 FREE ASSAY (FT-3): CPT | Performed by: PSYCHIATRY & NEUROLOGY

## 2019-12-20 PROCEDURE — 83036 HEMOGLOBIN GLYCOSYLATED A1C: CPT | Performed by: PSYCHIATRY & NEUROLOGY

## 2019-12-20 RX ORDER — CLONIDINE HYDROCHLORIDE 0.1 MG/1
0.1 TABLET ORAL 2 TIMES DAILY
Status: COMPLETED | OUTPATIENT
Start: 2019-12-21 | End: 2019-12-22

## 2019-12-20 RX ORDER — GABAPENTIN 100 MG/1
200 CAPSULE ORAL 3 TIMES DAILY
Status: COMPLETED | OUTPATIENT
Start: 2019-12-20 | End: 2019-12-21

## 2019-12-20 RX ORDER — GABAPENTIN 300 MG/1
300 CAPSULE ORAL 3 TIMES DAILY
Status: DISCONTINUED | OUTPATIENT
Start: 2019-12-21 | End: 2019-12-23 | Stop reason: HOSPADM

## 2019-12-20 RX ADMIN — CLONIDINE HYDROCHLORIDE 0.1 MG: 0.1 TABLET ORAL at 08:21

## 2019-12-20 RX ADMIN — CLONIDINE HYDROCHLORIDE 0.1 MG: 0.1 TABLET ORAL at 16:12

## 2019-12-20 RX ADMIN — GABAPENTIN 200 MG: 100 CAPSULE ORAL at 16:12

## 2019-12-20 RX ADMIN — SERTRALINE HYDROCHLORIDE 50 MG: 50 TABLET ORAL at 10:31

## 2019-12-20 RX ADMIN — Medication 1 TABLET: at 08:21

## 2019-12-20 RX ADMIN — CLONIDINE HYDROCHLORIDE 0.1 MG: 0.1 TABLET ORAL at 21:08

## 2019-12-20 RX ADMIN — GABAPENTIN 100 MG: 100 CAPSULE ORAL at 08:21

## 2019-12-20 RX ADMIN — TRAZODONE HYDROCHLORIDE 50 MG: 50 TABLET ORAL at 21:09

## 2019-12-20 RX ADMIN — INFLUENZA VIRUS VACCINE 0.5 ML: 15; 15; 15; 15 SUSPENSION INTRAMUSCULAR at 13:32

## 2019-12-20 RX ADMIN — GABAPENTIN 200 MG: 100 CAPSULE ORAL at 21:08

## 2019-12-20 NOTE — CASE MANAGEMENT
Spoke with patient about aftercare  Patient states Uncle (May Wilfredo) is going to be coming to the unit after he gets off work today ([phone])    CM will speak with patient and uncle at that time to coordinate aftercare

## 2019-12-20 NOTE — PROGRESS NOTES
12/20/19 0900   Team Meeting   Meeting Type Daily Rounds   Team Members Present   Team Members Present Physician;Nurse;; Other (Discipline and Name)   Physician Team Member [address] Team Member [facility] Management Team Member [address]   Social Work Team Member Jhoan   Other (Discipline and Name) Amy Maynard, 10 Jennifer Bowling, Willa, 10 Jennifer Bowling   Patient/Family Present   Patient Present No   Patient's Family Present No   Possible discharge Monday or Tuesday

## 2019-12-20 NOTE — CASE MANAGEMENT
Treatment Team meeting held this morning to discuss Treatment plan for current admission  In attendance: Dr Camryn Sharma RN, Elvis Marquez CM, and patient  Patient agreed and sign treatment plan  Patient also spoke of desire to go to [facility]  Patient states his uncle Jhonatan has been coordinating with Rehab for admission  Patient willing to sign DEVI for [facility] Hendricks and for [employer], it order to be assessed

## 2019-12-20 NOTE — PROGRESS NOTES
Pt visible on unite ,social with peers   Pt denies anxiety, depression, SI,HI  Pt is med/Meal compliant    Will continue to monitor

## 2019-12-20 NOTE — CASE MANAGEMENT
Received return phone call from Krystle at [employer] in [facility]   [phone]  Krystle states upon discharge, patient must come to facility immediately to be assessed

## 2019-12-20 NOTE — PLAN OF CARE
Problem: Risk for Self Injury/Neglect  Goal: Refrain from harming self  Description  Interventions:  - Monitor patient closely, per order  - Develop a trusting relationship  - Supervise medication ingestion, monitor effects and side effects   Outcome: Progressing

## 2019-12-20 NOTE — PROGRESS NOTES
Progress Note - Behavioral Health     Fernando Johnson 34 y o  male MRN: [id number]   Unit/Bed#: [facility] 350-01 Encounter: [id number]    Behavior over the last 24 hours: some improvement  Isabella Jaime continues to report anxiety symptoms and opioid withdrawal symptoms, but otherwise feels slightly less depressed today  He still has blunted affect and limited eye contact  Denies suicidal thoughts in the hospital today, but still would not feel safe if discharged  Compliant with medications  Attends group therapy  Sleep: slept off and on  Appetite: normal  Medication side effects: No   ROS: reports abdominal cramps, diarrhea and restless legs, all other systems are negative    Mental Status Evaluation:    Appearance:  casually dressed   Behavior:  cooperative   Speech:  soft   Mood:  anxious, slightly less depressed   Affect:  constricted   Thought Process:  organized, goal directed   Associations: intact associations   Thought Content:  no overt delusions   Perceptual Disturbances: no auditory hallucinations, no visual hallucinations   Risk Potential: Suicidal ideation - None at present  Homicidal ideation - None  Potential for aggression - No   Sensorium:  oriented to person, place and time/date   Memory:  recent and remote memory grossly intact   Consciousness:  alert and awake   Attention: decreased concentration and decreased attention span   Insight:  impaired   Judgment: impaired   Gait/Station: normal gait/station, normal balance   Motor Activity: no abnormal movements     Vital signs in last 24 hours:    Temp:  [98 4 °F (36 9 °C)-99 °F (37 2 °C)] 98 4 °F (36 9 °C)  HR:  [55-94] 76  Resp:  [16] 16  BP: (100-136)/(57-69) 119/57    Laboratory results: I have personally reviewed all pertinent laboratory/tests results      Suicide/Homicide Risk Assessment:    Risk of Harm to Self:   Current Specific Risk Factors include: current depressive symptoms, substance use  Protective Factors: no current suicidal ideation, ability to communicate with staff on the unit, able to contract for safety on the unit, taking medications as ordered on the unit  Based on today's assessment, Bille Balloon presents the following risk of harm to self: moderate    Risk of Harm to Others:  Based on today's assessment, Bille Balloon presents the following risk of harm to others: none    The following interventions are recommended: behavioral checks every 7 minutes, continued hospitalization on locked unit    Progress Toward Goals: some progress, still anxious, slightly less depressed, not suicidal today, still has opioid withdrawal symptoms    Assessment/Plan   Principal Problem:    Major depressive disorder, recurrent severe without psychotic features ([facility] )  Active Problems:    Opioid type dependence, continuous ([facility] )    Methamphetamine abuse ([facility] )    Elevated liver enzymes    Medical clearance for psychiatric admission    Low TSH level    Recommended Treatment:     Planned medication and treatment changes:     All current active medications have been reviewed  Encourage group therapy, milieu therapy and occupational therapy  Behavioral Health checks every 7 minutes  Observe progress over the weekend  Plan for inpatient Drug and Alcohol rehabilitation placement when psychiatrically stable  Increase Zoloft to 50 mg daily to help with depressive symptoms  Increase Neurontin to 200 mg tid and titrate dose to 300 mg tid to help with anxiety  Decrease Clonidine to 0 1 mg bid from tomorrow and taper off gradually    Continue all other medications:    Current Facility-Administered Medications:  acetaminophen 650 mg Oral Q6H PRN Mireya Santa MD   aluminum-magnesium hydroxide-simethicone 30 mL Oral Q4H PRN Candida Nyhan, MD   benztropine 1 mg Intramuscular Q6H PRN Mireya Santa MD   benztropine 1 mg Oral Q6H PRN Mireya Santa MD   cloNIDine 0 1 mg Oral Q4H PRN Candida Nyhan, MD   cloNIDine 0 1 mg Oral TID Mireya Santa MD   [START ON 12/21/2019] cloNIDine 0 1 mg Oral BID Mireya Santa MD   dicyclomine 20 mg Oral Q6H PRN Candida Nyhan, MD   gabapentin 200 mg Oral TID Mireya Santa MD   [START ON 12/21/2019] gabapentin 300 mg Oral TID Mireya Santa MD   haloperidol 5 mg Oral Q6H PRN Mireya Santa MD   haloperidol lactate 5 mg Intramuscular Q6H PRN Mireya Santa MD   hydrOXYzine HCL 25 mg Oral Q6H PRN Candida Nyhan, MD   hydrOXYzine HCL 50 mg Oral Q6H PRN Mireya Santa MD   hydrOXYzine HCL 75 mg Oral Q6H PRN Mireya Santa MD   ibuprofen 600 mg Oral Q8H PRN Candida Nyhan, MD   ibuprofen 800 mg Oral Q8H PRN Candida Nyhan, MD   influenza vaccine 0 5 mL Intramuscular Once Brooke Anaya MD   loperamide 2 mg Oral Q4H PRN Mireya Santa MD   LORazepam 1 mg Intramuscular Q6H PRN Mireya Santa MD   magnesium hydroxide 30 mL Oral Daily PRN Candida Nyhan, MD   methocarbamol 500 mg Oral Q6H PRN Candida Nyhan, MD   multivitamin-minerals 1 tablet Oral Daily Mireya Santa MD   OLANZapine 10 mg Oral Q3H PRN Mireya Santa MD   OLANZapine 10 mg Intramuscular Q3H PRN Mireya Santa MD   risperiDONE 1 mg Oral Q6H PRN Candida Nyhan, MD   sertraline 50 mg Oral Daily Mireya Santa MD   traZODone 50 mg Oral HS PRN Candida Nyhan, MD       Risks / Benefits of Treatment:    Risks, benefits, and possible side effects of medications explained to patient and patient verbalizes understanding and agreement for treatment  Counseling / Coordination of Care: Total floor / unit time spent today 35 minutes  Greater than 50% of total time was spent with the patient and / or family counseling and / or coordination of care  A description of counseling / coordination of care:    Patient's progress discussed with staff in treatment team meeting  Medications, treatment progress and treatment plan reviewed with patient  Medication changes discussed with patient  Medication education provided to patient    Discussed with patient today in Treatment Team Planning Meeting: treatment progress, treatment goals and goals for discharge  Treatment Plan was updated today with patient in Treatment Team Planning Meeting  Importance of follow up for substance abuse issues discussed with patient  Supportive therapy provided to patient      Gill Russell MD 12/20/19

## 2019-12-20 NOTE — PROGRESS NOTES
Met with patient who is committed to going into inpatient treatment  His brother is at [employer] and he wants to go to another branch of [facility]  He was at [facility] a year prior and was not successful he used within a month of treatment  Patient presents as guarded, depressed with a flat affect  He was cooperative  He states his parents are  and he was very close to his mother and he has not grieved her death; she was only 61  He describes her as his best friend  He lives with his mother's mother who is 80  His uncle is supportive and has been the person working with the [facility] to get him into rehab  Met with his uncle Jaz Finch and he called Dariel Russo at [facility] who reaffirmed that he needs to go to [facility] for an assessment  Patient will be discharged on Monday and transportation will be provided to [facility] in 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  He is on probation for retail theft and possession of heroin  He has never been in the [employer]  He has [employer] beliefs  He has never been  nor does he have children  He denies trauma other than his parent's death  Patient reports the following substance abuse history:  Alcohol use began at age 12 and when he was  he was drinking daily 5 beers and 8 shots of liquor; he states he quit drinking at 32  Marijuana use began at 17 1-2 joints daily and he has not smoked for the past 5 years  Methamphetamine use began 4 months ago to assist with his withdrawal from heroin; he does not know how much he used  He would smoke it and he has not used for the past month  Heroin use began at [age] IV a bundle daily with last use being 19  He has overdosed 3 times the last time was 4 years ago  Fentanyl use began at 18 2 bags occasionally last used 5 months ago  Percocets and Vicadin started when he was 13 after getting his wisdom teeth taken out   He would abuse them when he could get them  But has not used them since he began using heroin  He was on the Vivitrol shot but states he had side effects of depression and suicidal ideations thus does not want medications assistance  Patient will benefit form inpatient treatment if he is willing to address his underlying issues that result in his cravings and drug use  He has both shame and guilt  He also needs to deal with his grief over his mother's death and possible [employer] issues  As stated earlier patient he will be discharged on Monday transported to [facility] for an assessment and placement into rehab

## 2019-12-20 NOTE — PROGRESS NOTES
12/19/19 6415   Activity/Group Checklist   Group Other (Comment)  (Art Therapy Process Group/Open Choice, Discussion)   Attendance Attended   Attendance Duration (min) 16-30  (In and out; left early due to "withdrawl" symptoms)   Interactions Interacted appropriately   Affect/Mood Appropriate   Goals Achieved Able to listen to others; Able to recieve feedback  (Observed only for first 20 minutes and left group)

## 2019-12-20 NOTE — CASE MANAGEMENT
Phone call placed to [employer] to arrange transportation for Monday, December 23rd at [phone] for discharge ride to [facility] in [facility] [place]  Patient Lyft Qualifier Tool completed and Waiver and Release for [employer] signed and sent to be scanned into chart

## 2019-12-20 NOTE — PROGRESS NOTES
12/20/19 1100   Activity/Group Checklist   Group   (recovery group)   Attendance Attended   Attendance Duration (min) 46-60   Interactions Interacted appropriately   Affect/Mood Appropriate   Goals Achieved Discussed self-esteem issues; Discussed coping strategies; Able to listen to others; Able to engage in interactions; Able to self-disclose   Maslow hierarchy of needs

## 2019-12-20 NOTE — PROGRESS NOTES
CASAS GROUP NOTE  Contributed to group discussion  Expressed frustration with self and needing to "get control" of substance use  Participated in group activity  Sat among peers but did not interact with them  12/20/19 1000   Activity/Group Checklist   Group Life Skills  (Positive Affirmations, Daily Goals)   Attendance Attended   Attendance Duration (min) 31-45   Interactions Interacted appropriately   Affect/Mood Blunted/flat   Goals Achieved Able to listen to others; Able to engage in interactions; Able to self-disclose; Other (Comment)  (Reported "needing to go to rehab" )

## 2019-12-21 LAB
HAV IGM SER QL: ABNORMAL
HBV CORE AB SER QL: ABNORMAL
HBV CORE IGM SER QL: ABNORMAL
HBV CORE IGM SER QL: ABNORMAL
HBV SURFACE AG SER QL: ABNORMAL
HBV SURFACE AG SER QL: ABNORMAL
HCV AB SER QL: ABNORMAL
HCV AB SER QL: ABNORMAL

## 2019-12-21 PROCEDURE — 99232 SBSQ HOSP IP/OBS MODERATE 35: CPT | Performed by: PSYCHIATRY & NEUROLOGY

## 2019-12-21 RX ADMIN — SERTRALINE HYDROCHLORIDE 50 MG: 50 TABLET ORAL at 08:36

## 2019-12-21 RX ADMIN — CLONIDINE HYDROCHLORIDE 0.1 MG: 0.1 TABLET ORAL at 08:36

## 2019-12-21 RX ADMIN — CLONIDINE HYDROCHLORIDE 0.1 MG: 0.1 TABLET ORAL at 17:07

## 2019-12-21 RX ADMIN — TRAZODONE HYDROCHLORIDE 50 MG: 50 TABLET ORAL at 21:57

## 2019-12-21 RX ADMIN — GABAPENTIN 300 MG: 300 CAPSULE ORAL at 21:17

## 2019-12-21 RX ADMIN — GABAPENTIN 300 MG: 300 CAPSULE ORAL at 16:55

## 2019-12-21 RX ADMIN — Medication 1 TABLET: at 08:36

## 2019-12-21 RX ADMIN — GABAPENTIN 200 MG: 100 CAPSULE ORAL at 08:36

## 2019-12-21 NOTE — PROGRESS NOTES
Pt presents as visible in public areas as well as bedroom  Currently denies SI, HI and A/V hallucinations  Compliant with meds  Observed as calm, quiet and cooperative  Appears brighter while in milieu  Acknowledges plan to go to drug rehab, vocalizes agreement with plan

## 2019-12-21 NOTE — PROGRESS NOTES
Progress Note - Behavioral Health   Namita Guzman 34 y o  male MRN: [id number]  Unit/Bed#: [facility] 350-01 Encounter: [id number]   Staff reported patient has been cooperative he is in agreement to go to rehab once he is discharged and patient agrees with plan of care  When I talked to patient he stated has a long history of opiate addiction and he is just trying to remain sober and do what he needs to do to stop addiction  Denied withdrawal symptoms      Behavior over the last 24 hours:  improved  Sleep: normal  Appetite: normal  Medication side effects: No  ROS: no complaints    Medications:   Current Facility-Administered Medications   Medication Dose Route Frequency Provider Last Rate Last Dose    acetaminophen (TYLENOL) tablet 650 mg  650 mg Oral Q6H PRN Kerrie Kelsey MD        aluminum-magnesium hydroxide-simethicone (MYLANTA) 200-200-20 mg/5 mL oral suspension 30 mL  30 mL Oral Q4H PRN Jignesh Ramirez MD        benztropine (COGENTIN) injection 1 mg  1 mg Intramuscular Q6H PRN Kerrie Kelsey MD        benztropine (COGENTIN) tablet 1 mg  1 mg Oral Q6H PRN Kerrie Kelsey MD        cloNIDine (CATAPRES) tablet 0 1 mg  0 1 mg Oral Q4H PRN Jignesh Ramirez MD   0 1 mg at 12/19/19 0854    cloNIDine (CATAPRES) tablet 0 1 mg  0 1 mg Oral BID Kerrie Kelsey MD   0 1 mg at 12/21/19 0836    dicyclomine (BENTYL) tablet 20 mg  20 mg Oral Q6H PRN Jignesh Ramirez MD   20 mg at 12/19/19 0855    gabapentin (NEURONTIN) capsule 300 mg  300 mg Oral TID Kerrie Kelsey MD        haloperidol (HALDOL) tablet 5 mg  5 mg Oral Q6H PRN Kerrie Kelsey MD        haloperidol lactate (HALDOL) injection 5 mg  5 mg Intramuscular Q6H PRN Kerrie Kelsey MD        hydrOXYzine HCL (ATARAX) tablet 25 mg  25 mg Oral Q6H PRN Jignesh Ramirez MD        hydrOXYzine HCL (ATARAX) tablet 50 mg  50 mg Oral Q6H PRN Kerrie Kelsey MD        hydrOXYzine HCL (ATARAX) tablet 75 mg  75 mg Oral Q6H PRN MD Nicholas Kennedy Ok ibuprofen (MOTRIN) tablet 600 mg  600 mg Oral Q8H PRN Morteza Mortensen MD        ibuprofen (MOTRIN) tablet 800 mg  800 mg Oral Q8H PRN Morteza Mortensen MD        loperamide (IMODIUM) capsule 2 mg  2 mg Oral Q4H PRN Norma Morillo MD        LORazepam (ATIVAN) 2 mg/mL injection 1 mg  1 mg Intramuscular Q6H PRN Norma Morillo MD        magnesium hydroxide (MILK OF MAGNESIA) 400 mg/5 mL oral suspension 30 mL  30 mL Oral Daily PRN Morteza Mortensen MD        methocarbamol (ROBAXIN) tablet 500 mg  500 mg Oral Q6H PRN Morteza Mortensen MD   500 mg at 12/19/19 2126    multivitamin-minerals (CENTRUM) tablet 1 tablet  1 tablet Oral Daily Norma Morillo MD   1 tablet at 12/21/19 0836    OLANZapine (ZyPREXA ZYDIS) dispersible tablet 10 mg  10 mg Oral Q3H PRN Norma Morillo MD        OLANZapine (ZyPREXA) IM injection 10 mg  10 mg Intramuscular Q3H PRN Norma Morillo MD        risperiDONE (RisperDAL M-TABS) dispersible tablet 1 mg  1 mg Oral Q6H PRN Morteza Mortensen MD        sertraline (ZOLOFT) tablet 50 mg  50 mg Oral Daily Norma Morillo MD   50 mg at 12/21/19 0836    traZODone (DESYREL) tablet 50 mg  50 mg Oral HS PRN Morteza Mortensen MD   50 mg at 12/20/19 2109     Medications Prior to Admission   Medication    acetaminophen (TYLENOL) 650 mg CR tablet    neomycin-polymyxin-hydrocortisone (CORTISPORIN) 0 35%-10,000 units/mL-1% otic suspension       Labs:   Admission on 12/18/2019   Component Date Value    RPR 12/18/2019 Non-Reactive     TSH 3RD GENERATON 12/18/2019 0 333*    WBC 12/19/2019 7 10     RBC 12/19/2019 4 66     Hemoglobin 12/19/2019 14 5     Hematocrit 12/19/2019 41 2     MCV 12/19/2019 88     MCH 12/19/2019 31 0     MCHC 12/19/2019 35 1     RDW 12/19/2019 13 8     MPV 12/19/2019 9 3     Platelets 21/16/2169 192     Neutrophils Relative 12/19/2019 55     Lymphocytes Relative 12/19/2019 33     Monocytes Relative 12/19/2019 10     Eosinophils Relative 12/19/2019 2     Basophils Relative 12/19/2019 1     Neutrophils Absolute 12/19/2019 3 90     Lymphocytes Absolute 12/19/2019 2 40     Monocytes Absolute 12/19/2019 0 70     Eosinophils Absolute 12/19/2019 0 10     Basophils Absolute 12/19/2019 0 00     Sodium 12/19/2019 137     Potassium 12/19/2019 3 9     Chloride 12/19/2019 105     CO2 12/19/2019 27     ANION GAP 12/19/2019 5     BUN 12/19/2019 13     Creatinine 12/19/2019 0 47*    Glucose 12/19/2019 105*    Glucose, Fasting 12/19/2019 105*    Calcium 12/19/2019 9 4     AST 12/19/2019 173*    ALT 12/19/2019 339*    Alkaline Phosphatase 12/19/2019 83     Total Protein 12/19/2019 7 0     Albumin 12/19/2019 3 9     Total Bilirubin 12/19/2019 0 70     eGFR 12/19/2019 150     Cholesterol 12/19/2019 107     Triglycerides 12/19/2019 49     HDL, Direct 12/19/2019 36*    LDL Calculated 12/19/2019 61     Non-HDL-Chol (CHOL-HDL) 12/19/2019 71     Ventricular Rate 12/18/2019 82     Atrial Rate 12/18/2019 82     MA Interval 12/18/2019 120     QRSD Interval 12/18/2019 86     QT Interval 12/18/2019 380     QTC Interval 12/18/2019 443     P Axis 12/18/2019 15     QRS Axis 12/18/2019 52     T Wave Axis 12/18/2019 53     Hemoglobin A1C 12/20/2019 4 7     EAG 12/20/2019 88     T3, Free 12/20/2019 2 10*    Free T4 12/20/2019 1 30        Mental Status Evaluation:  Appearance:  age appropriate and casually dressed   Behavior:  Cooperative   Speech:  normal pitch and normal volume   Mood:  less labile   Affect:  mood-congruent   Associations: intact associations   Thought Process:  coherent   Thought Content:  No overt delusions   Perceptual Disturbances: None   Risk Potential: Suicidal Ideations none   Sensorium:  person and place   Memory recent and remote memory grossly intact   Consciousness:  alert    Attention: attention span appeared shorter than expected for age   Insight:  Improving   Judgment: Improving   Gait/Station: normal gait/station   Motor Activity: no abnormal movements     Progress Toward Goals:  Patient has been compliant with treatment, his interacting well with peers and staff and is looking forward to discharge on Monday in going to rehab    Assessment/Plan   Principal Problem:    Major depressive disorder, recurrent severe without psychotic features ([facility] )  Active Problems:    Opioid type dependence, continuous ([facility] )    Methamphetamine abuse ([place] [id number] )    Elevated liver enzymes    Medical clearance for psychiatric admission    Low TSH level    Medications:  Clonidine 0 1 mg twice a day  Gabapentin 200 mg 3 times a day and starting 12/21 gabapentin increased to 300 mg 3 times a day  Sertraline 50 mg daily  Recommended Treatment: Continue with group therapy, milieu therapy and occupational therapy  Risks, benefits and possible side effects of Medications:   Risks, benefits, and possible side effects of medications explained to patient and patient verbalizes understanding  Counseling / Coordination of Care  Total floor / unit time spent today 20 minutes  Greater than 50% of total time was spent with the patient and / or family counseling and / or coordination of care

## 2019-12-21 NOTE — PROGRESS NOTES
Pt has been visible and social in the milieu  He denies all s/s and is ready for d/c on Monday  He is going to in  rehab after d/c from here

## 2019-12-21 NOTE — PROGRESS NOTES
Pt denies all symptoms  He is cooperative and pleasant  He states "I had a great day " PRN Trazodone given at his request for sleep; will monitor

## 2019-12-21 NOTE — PLAN OF CARE
Problem: Risk for Self Injury/Neglect  Goal: Treatment Goal: Remain safe during length of stay, learn and adopt new coping skills, and be free of self-injurious ideation, impulses and acts at the time of discharge  Outcome: Progressing  Goal: Refrain from harming self  Description  Interventions:  - Monitor patient closely, per order  - Develop a trusting relationship  - Supervise medication ingestion, monitor effects and side effects   Outcome: Progressing     Problem: SUBSTANCE USE/ABUSE  Goal: By discharge, will develop insight into their chemical dependency and sustain motivation to continue in recovery  Description  INTERVENTIONS:  - Attends all daily group sessions and scheduled AA groups  - Actively practices coping skills through participation in the therapeutic community and adherence to program rules  - Reviews and completes assignments from individual treatment plan  - Assist patient development of understanding of their personal cycle of addiction and relapse triggers  Outcome: Progressing  Goal: By discharge, patient will have ongoing treatment plan addressing chemical dependency  Description  INTERVENTIONS:  - Assist patient with resources and/or appointments for ongoing recovery based living  Outcome: Progressing     Problem: Ineffective Coping  Goal: Participates in unit activities  Description  Interventions:  - Provide therapeutic environment   - Provide required programming   - Redirect inappropriate behaviors   Outcome: Progressing     Problem: DISCHARGE PLANNING - CARE MANAGEMENT  Goal: Discharge to post-acute care or home with appropriate resources  Description  INTERVENTIONS:  - Conduct assessment to determine patient/family and health care team treatment goals, and need for post-acute services based on payer coverage, community resources, and patient preferences, and barriers to discharge  - Address psychosocial, clinical, and financial barriers to discharge as identified in assessment in conjunction with the patient/family and health care team  - Arrange appropriate level of post-acute services according to patients   needs and preference and payer coverage in collaboration with the physician and health care team  - Communicate with and update the patient/family, physician, and health care team regarding progress on the discharge plan  - Arrange appropriate transportation to post-acute venues  Outcome: Progressing     Problem: SELF HARM/SUICIDALITY  Goal: Will have no self-injury during hospital stay  Description  INTERVENTIONS:  - Q 15 MINUTES: Routine safety checks  - Q WAKING SHIFT & PRN: Assess risk to determine if routine checks are adequate to maintain patient safety  - Encourage patient to participate actively in care by formulating a plan to combat response to suicidal ideation, identify supports and resources  Outcome: Progressing

## 2019-12-21 NOTE — PROGRESS NOTES
12/21/19 1015   Activity/Group Checklist   Group Other (Comment)  (Surviving Trauma Group/Education, Open Discussion)   Attendance Attended   Attendance Duration (min) Greater than 60   Interactions Interacted appropriately   Affect/Mood Appropriate   Goals Achieved Able to listen to others; Able to engage in interactions; Able to recieve feedback; Able to give feedback to another

## 2019-12-22 LAB — HIV 1+2 AB+HIV1 P24 AG SERPL QL IA: NORMAL

## 2019-12-22 PROCEDURE — 99231 SBSQ HOSP IP/OBS SF/LOW 25: CPT | Performed by: PSYCHIATRY & NEUROLOGY

## 2019-12-22 RX ORDER — NICOTINE 21 MG/24HR
1 PATCH, TRANSDERMAL 24 HOURS TRANSDERMAL DAILY
Status: DISCONTINUED | OUTPATIENT
Start: 2019-12-22 | End: 2019-12-23 | Stop reason: HOSPADM

## 2019-12-22 RX ADMIN — Medication 1 TABLET: at 08:48

## 2019-12-22 RX ADMIN — CLONIDINE HYDROCHLORIDE 0.1 MG: 0.1 TABLET ORAL at 08:48

## 2019-12-22 RX ADMIN — SERTRALINE HYDROCHLORIDE 50 MG: 50 TABLET ORAL at 08:48

## 2019-12-22 RX ADMIN — GABAPENTIN 300 MG: 300 CAPSULE ORAL at 08:48

## 2019-12-22 RX ADMIN — NICOTINE 1 PATCH: 14 PATCH, EXTENDED RELEASE TRANSDERMAL at 13:04

## 2019-12-22 RX ADMIN — TRAZODONE HYDROCHLORIDE 50 MG: 50 TABLET ORAL at 22:05

## 2019-12-22 RX ADMIN — GABAPENTIN 300 MG: 300 CAPSULE ORAL at 16:55

## 2019-12-22 RX ADMIN — GABAPENTIN 300 MG: 300 CAPSULE ORAL at 21:13

## 2019-12-22 NOTE — PROGRESS NOTES
Progress Note - Behavioral Health   Francena Litten 34 y o  male MRN: [id number]  Unit/Bed#: [facility] 342-01 Encounter: [id number]   Staff reported patient continues to look forward to his discharged  Has been appropriate in the milieu  When I talked to patient he stated medications are helpful, he has no side effects, and asked if he could have nicorette gum  Sleep: normal  Appetite: normal  Medication side effects: No  ROS: Talked about nicotine cravings        Medications:   Current Facility-Administered Medications   Medication Dose Route Frequency Provider Last Rate Last Dose    acetaminophen (TYLENOL) tablet 650 mg  650 mg Oral Q6H PRN Valerie Guadalupe MD        aluminum-magnesium hydroxide-simethicone (MYLANTA) 200-200-20 mg/5 mL oral suspension 30 mL  30 mL Oral Q4H PRN Qian Diego MD        benztropine (COGENTIN) injection 1 mg  1 mg Intramuscular Q6H PRN Valerie Guadalupe MD        benztropine (COGENTIN) tablet 1 mg  1 mg Oral Q6H PRN Valerie Guadalupe MD        cloNIDine (CATAPRES) tablet 0 1 mg  0 1 mg Oral Q4H PRN Qian Diego MD   0 1 mg at 12/19/19 0854    dicyclomine (BENTYL) tablet 20 mg  20 mg Oral Q6H PRN Qian Diego MD   20 mg at 12/19/19 0855    gabapentin (NEURONTIN) capsule 300 mg  300 mg Oral TID Valerie Guadalupe MD   300 mg at 12/22/19 0848    haloperidol (HALDOL) tablet 5 mg  5 mg Oral Q6H PRN Valerie Guadalupe MD        haloperidol lactate (HALDOL) injection 5 mg  5 mg Intramuscular Q6H PRN Valerie Guadalupe MD        hydrOXYzine HCL (ATARAX) tablet 25 mg  25 mg Oral Q6H PRN Qian Diego MD        hydrOXYzine HCL (ATARAX) tablet 50 mg  50 mg Oral Q6H PRN Valerie Guadalupe MD        hydrOXYzine HCL (ATARAX) tablet 75 mg  75 mg Oral Q6H PRN Valerie Guadalupe MD        ibuprofen (MOTRIN) tablet 600 mg  600 mg Oral Q8H PRN Qian Diego MD        ibuprofen (MOTRIN) tablet 800 mg  800 mg Oral Q8H PRN Qian Diego MD        loperamide (IMODIUM) capsule 2 mg  2 mg Oral Q4H PRN Graham Gunter MD        LORazepam (ATIVAN) 2 mg/mL injection 1 mg  1 mg Intramuscular Q6H PRN Graham Gunter MD        magnesium hydroxide (MILK OF MAGNESIA) 400 mg/5 mL oral suspension 30 mL  30 mL Oral Daily PRN Arian Tim MD        methocarbamol (ROBAXIN) tablet 500 mg  500 mg Oral Q6H PRN Arian Tim MD   500 mg at 12/19/19 2126    multivitamin-minerals (CENTRUM) tablet 1 tablet  1 tablet Oral Daily Graham Gunter MD   1 tablet at 12/22/19 0848    nicotine (NICODERM CQ) 14 mg/24hr TD 24 hr patch 1 patch  1 patch Transdermal Daily Saeed Thomas MD   1 patch at 12/22/19 1304    OLANZapine (ZyPREXA ZYDIS) dispersible tablet 10 mg  10 mg Oral Q3H PRN Graham Gunter MD        OLANZapine (ZyPREXA) IM injection 10 mg  10 mg Intramuscular Q3H PRN Graham Gunter MD        risperiDONE (RisperDAL M-TABS) dispersible tablet 1 mg  1 mg Oral Q6H PRN Arian Tim MD        sertraline (ZOLOFT) tablet 50 mg  50 mg Oral Daily Graham Gunter MD   50 mg at 12/22/19 0848    traZODone (DESYREL) tablet 50 mg  50 mg Oral HS PRN Arian Tim MD   50 mg at 12/21/19 2157     Medications Prior to Admission   Medication    acetaminophen (TYLENOL) 650 mg CR tablet    neomycin-polymyxin-hydrocortisone (CORTISPORIN) 0 35%-10,000 units/mL-1% otic suspension       Labs:   Admission on 12/18/2019   Component Date Value    RPR 12/18/2019 Non-Reactive     TSH 3RD GENERATON 12/18/2019 0 333*    WBC 12/19/2019 7 10     RBC 12/19/2019 4 66     Hemoglobin 12/19/2019 14 5     Hematocrit 12/19/2019 41 2     MCV 12/19/2019 88     [address] 12/19/2019 31 0     MCHC 12/19/2019 35 1     RDW 12/19/2019 13 8     MPV 12/19/2019 9 3     Platelets 12/92/3573 192     Neutrophils Relative 12/19/2019 55     Lymphocytes Relative 12/19/2019 33     Monocytes Relative 12/19/2019 10     Eosinophils Relative 12/19/2019 2     Basophils Relative 12/19/2019 1     Neutrophils Absolute 12/19/2019 3 90     Lymphocytes Absolute 12/19/2019 2 40     Monocytes Absolute 12/19/2019 0 70     Eosinophils Absolute 12/19/2019 0 10     Basophils Absolute 12/19/2019 0 00     Sodium 12/19/2019 137     Potassium 12/19/2019 3 9     Chloride 12/19/2019 105     CO2 12/19/2019 27     ANION GAP 12/19/2019 5     BUN 12/19/2019 13     Creatinine 12/19/2019 0 47*    Glucose 12/19/2019 105*    Glucose, Fasting 12/19/2019 105*    Calcium 12/19/2019 9 4     AST 12/19/2019 173*    ALT 12/19/2019 339*    Alkaline Phosphatase 12/19/2019 83     Total Protein 12/19/2019 7 0     Albumin 12/19/2019 3 9     Total Bilirubin 12/19/2019 0 70     eGFR 12/19/2019 150     Cholesterol 12/19/2019 107     Triglycerides 12/19/2019 49     HDL, Direct 12/19/2019 36*    LDL Calculated 12/19/2019 61     Non-HDL-Chol (CHOL-HDL) 12/19/2019 71     Ventricular Rate 12/18/2019 82     Atrial Rate 12/18/2019 82     DC Interval 12/18/2019 120     QRSD Interval 12/18/2019 86     QT Interval 12/18/2019 380     QTC Interval 12/18/2019 443     P Axis 12/18/2019 15     QRS Axis 12/18/2019 52     T Wave Axis 12/18/2019 53     Hepatitis B Surface Ag 12/19/2019 Non-reactive     Hep A IgM 12/19/2019 Non-reactive     Hepatitis C Ab 12/19/2019 High Reactive*    Hep B C IgM 12/19/2019 Non-reactive     Hepatitis B Surface Ag 12/19/2019 Non-reactive     Hepatitis C Ab 12/19/2019 High Reactive*    Hep B C IgM 12/19/2019 Non-reactive     Hep B Core Total Ab 12/19/2019 Non-reactive     Hemoglobin A1C 12/20/2019 4 7     EAG 12/20/2019 88     HIV-1/HIV-2 Ab 12/20/2019 Non-Reactive     T3, Free 12/20/2019 2 10*    Free T4 12/20/2019 1 30        Mental Status Evaluation:  Appearance:  Casually dressed, expanders in ear lobes, (ear gauges)   Behavior:  Cooperative   Speech:  normal pitch and normal volume   Mood:  less labile   Affect:  Broad and euthymic   Associations: intact associations   Thought Process:  coherent   Thought Content:  No overt delusions Perceptual Disturbances: None   Risk Potential: Suicidal Ideations none, no homicidal ideations   Sensorium:  person and place   Memory recent and remote memory grossly intact   Consciousness:  alert    Attention: attention span appeared shorter than expected for age   Insight:  Improving   Judgment: Improving   Gait/Station: normal gait/station   Motor Activity: no abnormal movements     Progress Toward Goals:  Patient has been compliant with treatment, he is interacting well with peers and staff and is looking forward to discharge on Monday in going to rehab  Today asked for nicotine gums  Assessment/Plan   Principal Problem:    Major depressive disorder, recurrent severe without psychotic features ([facility] )  Active Problems:    Opioid type dependence, continuous ([facility])    Methamphetamine abuse ([facility])    Elevated liver enzymes    Medical clearance for psychiatric admission    Low TSH level    Medications:  Clonidine 0 1 mg twice a day  Gabapentin 300 mg 3 times a day    Sertraline 50 mg daily    Recommended Treatment: Continue with group therapy, milieu therapy and occupational therapy  Risks, benefits and possible side effects of Medications:   Risks, benefits, and possible side effects of medications explained to patient and patient verbalizes understanding  Counseling / Coordination of Care  Total floor / unit time spent today 20 minutes  Greater than 50% of total time was spent with the patient and / or family counseling and / or coordination of care

## 2019-12-22 NOTE — PROGRESS NOTES
Patient was bright and social in the milieu this morning  Was cooperative with medications and denied symptoms and needs   Said "I'm good because I'm leaving tomorrow "

## 2019-12-22 NOTE — PLAN OF CARE
Problem: Risk for Self Injury/Neglect  Goal: Treatment Goal: Remain safe during length of stay, learn and adopt new coping skills, and be free of self-injurious ideation, impulses and acts at the time of discharge  Outcome: Progressing  Goal: Refrain from harming self  Description  Interventions:  - Monitor patient closely, per order  - Develop a trusting relationship  - Supervise medication ingestion, monitor effects and side effects   Outcome: Progressing     Problem: SUBSTANCE USE/ABUSE  Goal: By discharge, will develop insight into their chemical dependency and sustain motivation to continue in recovery  Description  INTERVENTIONS:  - Attends all daily group sessions and scheduled AA groups  - Actively practices coping skills through participation in the therapeutic community and adherence to program rules  - Reviews and completes assignments from individual treatment plan  - Assist patient development of understanding of their personal cycle of addiction and relapse triggers  Outcome: Progressing  Goal: By discharge, patient will have ongoing treatment plan addressing chemical dependency  Description  INTERVENTIONS:  - Assist patient with resources and/or appointments for ongoing recovery based living  Outcome: Progressing     Problem: SELF HARM/SUICIDALITY  Goal: Will have no self-injury during hospital stay  Description  INTERVENTIONS:  - Q 15 MINUTES: Routine safety checks  - Q WAKING SHIFT & PRN: Assess risk to determine if routine checks are adequate to maintain patient safety  - Encourage patient to participate actively in care by formulating a plan to combat response to suicidal ideation, identify supports and resources  Outcome: Progressing

## 2019-12-23 VITALS
BODY MASS INDEX: 22.73 KG/M2 | HEART RATE: 66 BPM | OXYGEN SATURATION: 98 % | SYSTOLIC BLOOD PRESSURE: 135 MMHG | WEIGHT: 150 LBS | HEIGHT: 68 IN | DIASTOLIC BLOOD PRESSURE: 78 MMHG | TEMPERATURE: 98 F | RESPIRATION RATE: 16 BRPM

## 2019-12-23 LAB
ALBUMIN SERPL BCP-MCNC: 4.5 G/DL (ref 3–5.2)
ALP SERPL-CCNC: 77 U/L (ref 43–122)
ALT SERPL W P-5'-P-CCNC: 277 U/L (ref 9–52)
ANION GAP SERPL CALCULATED.3IONS-SCNC: 12 MMOL/L (ref 5–14)
AST SERPL W P-5'-P-CCNC: 120 U/L (ref 17–59)
BILIRUB SERPL-MCNC: 0.6 MG/DL
BUN SERPL-MCNC: 10 MG/DL (ref 5–25)
CALCIUM SERPL-MCNC: 9.6 MG/DL (ref 8.4–10.2)
CHLORIDE SERPL-SCNC: 101 MMOL/L (ref 97–108)
CO2 SERPL-SCNC: 28 MMOL/L (ref 22–30)
CREAT SERPL-MCNC: 0.59 MG/DL (ref 0.7–1.5)
GFR SERPL CREATININE-BSD FRML MDRD: 137 ML/MIN/1.73SQ M
GLUCOSE P FAST SERPL-MCNC: 88 MG/DL (ref 70–99)
GLUCOSE SERPL-MCNC: 88 MG/DL (ref 70–99)
POTASSIUM SERPL-SCNC: 4.2 MMOL/L (ref 3.6–5)
PROT SERPL-MCNC: 7.7 G/DL (ref 5.9–8.4)
SODIUM SERPL-SCNC: 141 MMOL/L (ref 137–147)

## 2019-12-23 PROCEDURE — 99232 SBSQ HOSP IP/OBS MODERATE 35: CPT | Performed by: FAMILY MEDICINE

## 2019-12-23 PROCEDURE — 80053 COMPREHEN METABOLIC PANEL: CPT | Performed by: PSYCHIATRY & NEUROLOGY

## 2019-12-23 PROCEDURE — 99238 HOSP IP/OBS DSCHRG MGMT 30/<: CPT | Performed by: NURSE PRACTITIONER

## 2019-12-23 RX ORDER — GABAPENTIN 300 MG/1
300 CAPSULE ORAL 3 TIMES DAILY
Qty: 78 CAPSULE | Refills: 0 | Status: SHIPPED | OUTPATIENT
Start: 2019-12-23 | End: 2020-01-18

## 2019-12-23 RX ADMIN — GABAPENTIN 300 MG: 300 CAPSULE ORAL at 08:21

## 2019-12-23 RX ADMIN — NICOTINE 1 PATCH: 14 PATCH, EXTENDED RELEASE TRANSDERMAL at 08:21

## 2019-12-23 RX ADMIN — SERTRALINE HYDROCHLORIDE 50 MG: 50 TABLET ORAL at 08:21

## 2019-12-23 RX ADMIN — Medication 1 TABLET: at 08:21

## 2019-12-23 NOTE — NURSING NOTE
Pt given/reviewed D/C instructions, education and scripts  Pt will be going to rehab for drug/alcohol abuse  All belongings returned to pt

## 2019-12-23 NOTE — ASSESSMENT & PLAN NOTE
Patient admitted and found to have low TSH level and slightly low free T3 level but normal free T4 level    Strongly encouraged him to avoid IV drug use as it may impair his thyroid function test   Ordered outpatient TSH and free T4 in 1 month and outpatient follow-up with PCP

## 2019-12-23 NOTE — BH TRANSITION RECORD
Contact Information: If you have any questions, concerns, pended studies, tests and/or procedures, or emergencies regarding your inpatient behavioral health visit  Please contact [facility] behavioral health unit 3B [phone]  and ask to speak to a , nurse or physician  A contact is available 24 hours/ 7 days a week at this number  Summary of Procedures Performed During your Stay:  Below is a list of major procedures performed during your hospital stay and a summary of results:  - No major procedures performed  Pending Studies (From admission, onward)    None        If studies are pending at discharge, follow up with your PCP and/or referring provider

## 2019-12-23 NOTE — DISCHARGE SUMMARY
Discharge Summary - [address] [facility] 34 y o  male MRN: [id number]  Unit/Bed#: Larry Leon 342-01 Encounter: [id number]     Admission Date: 12/18/2019         Discharge Date: No discharge date for patient encounter  Attending Psychiatrist: Mireya Santa MD    Reason for Admission/HPI:   HPI Copied per Admission HPI     "Rebeka Barnes is a 34 y o  male with a history of substance use and no past history of psychiatric illness who was admitted to the inpatient psychiatric unit on a voluntary 201 commitment basis due to depression, anxiety, substance abuse and suicidal ideation without plan      Symptoms prior to admission included feeling depressed, suicidal ideation, hopelessness, helplessness, poor concentration, difficulty sleeping, anxiety symptoms, drug abuse and difficulty attending to activities of daily living  Onset of symptoms was gradual starting few weeks ago with progressively worsening course since that time  Stressors preceding admission included drug use problems and anniversary of mother's death  Trini Billings presented to ED reporting depressive symptoms, suicidal ideation without a plan and opioid withdrawal  He was frustrated with upcoming anniversary of his mother's death, upcoming Holidays and difficulty to stop his heroin use  He felt that he was "tired of living that way" and decide to get psychiatric help      On initial evaluation after admission to the inpatient psychiatric unit Trini Billings was still feeling depressed and anxious   He reported significant opioid withdrawal symptoms and appeared to be in distress due to withdrawal  He agreed to start treatment with an antidepressant to help with depressive symptoms and was also motivated for inpatient Drug and Alcohol rehabilitation treatment once mood was more stable "      Meds/Allergies     all current active meds have been reviewed    No Known Allergies    Objective     Vital signs in last 24 hours:  Temp:  [98 °F (36 7 °C)-98 6 °F (37 °C)] 98 °F (36 7 °C)  HR:  [62-66] 66  Resp:  [16] 16  BP: (126-135)/(66-78) 135/78    No intake or output data in the 24 hours ending 12/23/19 7830 [employer] [place] Course: The patient was admitted to the inpatient psychiatric unit and started on every 15 minutes precautions  During the hospitalization the patient was attending individual therapy, group therapy, milieu therapy and occupational therapy  Psychiatric medications were titrated over the hospital stay  To address depression, depressive symptoms and anxiety symptoms the patient was started on antidepressant Zoloft and anxiolytic medication Neurontin  Medication doses were titrated during the hospital course  Prior to beginning of treatment medications risks and benefits and possible side effects including risk of suicidality and serotonin syndrome related to treatment with antidepressants were reviewed with the patient  The patient verbalized understanding and agreement for treatment  Patient's symptoms improved gradually over the hospital course  At the end of treatment the patient was doing well  Mood was stable at the time of discharge  The patient denied suicidal ideation, intent or plan at the time of discharge and denied homicidal ideation, intent or plan at the time of discharge  There was no overt psychosis at the time of discharge  Sleep and appetite were improved  The patient was tolerating medications and was not reporting any significant side effects at the time of discharge  Since the patient was doing well at the end of the hospitalization, treatment team felt that the patient could be safely discharged to [facility]  Since Anjel Camacho was doing well at the end of the hospitalization, treatment team felt that Anjel Camacho could be safely discharged to outpatient care  The follow up with [facility] was arranged by the unit  upon discharge      Mental Status at Time of Discharge:   Appearance:  Adequate hygiene and grooming   Behavior:  calm, cooperative and friendly   Speech:   Language: Normal rate and Normal volume  Able to name objects   Mood:  euthymic   Affect:   Associations: mood-congruent  Tightly connected   Thought Process:  Goal directed and coherent   Thought Content:  Does not verbalize delusional material   Perceptual Disturbances: Denies hallucinations and does not appear to be responding to internal stimuli     Risk Potential: No suicidal or homicidal ideation   Orientation   Language Oriented x 3  anomia No   Memory  Fund of knowledge grossly intact  aware of current events   Attention/Concentration attention span and concentration were age appropriate   Insight:  Good insight   Judgment: Good judgment   Gait/Station: normal gait/station   Motor Activity: No abnormal movement noted       Admission Diagnosis:  Principal Problem:    Major depressive disorder, recurrent severe without psychotic features ([facility] [address] )  Active Problems:    Opioid type dependence, continuous ([facility] [address] )    Methamphetamine abuse ([facility] [address] )    Elevated liver enzymes    Medical clearance for psychiatric admission    Low TSH level      Discharge Diagnosis:     Principal Problem:    Major depressive disorder, recurrent severe without psychotic features ([facility] [address] )  Active Problems:    Opioid type dependence, continuous ([facility] [address] )    Methamphetamine abuse ([facility] [address] )    Elevated liver enzymes    Medical clearance for psychiatric admission    Low TSH level  Resolved Problems:    * No resolved hospital problems   *      Lab results:    Admission on 12/18/2019   Component Date Value    RPR 12/18/2019 Non-Reactive     TSH 3RD GENERATON 12/18/2019 0 333*    WBC 12/19/2019 7 10     RBC 12/19/2019 4 66     Hemoglobin 12/19/2019 14 5     Hematocrit 12/19/2019 41 2     MCV 12/19/2019 88     MCH 12/19/2019 31 0     MCHC 12/19/2019 35 1     RDW 12/19/2019 13 8     MPV 12/19/2019 9 3     Platelets 83/88/1652 192     Neutrophils Relative 12/19/2019 55     Lymphocytes Relative 12/19/2019 33     Monocytes Relative 12/19/2019 10     Eosinophils Relative 12/19/2019 2     Basophils Relative 12/19/2019 1     Neutrophils Absolute 12/19/2019 3 90     Lymphocytes Absolute 12/19/2019 2 40     Monocytes Absolute 12/19/2019 0 70     Eosinophils Absolute 12/19/2019 0 10     Basophils Absolute 12/19/2019 0 00     Sodium 12/19/2019 137     Potassium 12/19/2019 3 9     Chloride 12/19/2019 105     CO2 12/19/2019 27     ANION GAP 12/19/2019 5     BUN 12/19/2019 13     Creatinine 12/19/2019 0 47*    Glucose 12/19/2019 105*    Glucose, Fasting 12/19/2019 105*    Calcium 12/19/2019 9 4     AST 12/19/2019 173*    ALT 12/19/2019 339*    Alkaline Phosphatase 12/19/2019 83     Total Protein 12/19/2019 7 0     Albumin 12/19/2019 3 9     Total Bilirubin 12/19/2019 0 70     eGFR 12/19/2019 150     Cholesterol 12/19/2019 107     Triglycerides 12/19/2019 49     HDL, Direct 12/19/2019 36*    LDL Calculated 12/19/2019 61     Non-HDL-Chol (CHOL-HDL) 12/19/2019 71     Ventricular Rate 12/18/2019 82     Atrial Rate 12/18/2019 82     OH Interval 12/18/2019 120     QRSD Interval 12/18/2019 86     QT Interval 12/18/2019 380     QTC Interval 12/18/2019 443     P Axis 12/18/2019 15     QRS Axis 12/18/2019 52     T Wave Axis 12/18/2019 53     Hepatitis B Surface Ag 12/19/2019 Non-reactive     Hep A IgM 12/19/2019 Non-reactive     Hepatitis C Ab 12/19/2019 High Reactive*    Hep B C IgM 12/19/2019 Non-reactive     Hepatitis B Surface Ag 12/19/2019 Non-reactive     Hepatitis C Ab 12/19/2019 High Reactive*    Hep B C IgM 12/19/2019 Non-reactive     Hep B Core Total Ab 12/19/2019 Non-reactive     Hemoglobin A1C 12/20/2019 4 7     EAG 12/20/2019 88     HIV-1/HIV-2 Ab 12/20/2019 Non-Reactive     T3, Free 12/20/2019 2 10*    Free T4 12/20/2019 1 30     Sodium 12/23/2019 141     Potassium 12/23/2019 4 2     Chloride 12/23/2019 101     CO2 12/23/2019 28     ANION GAP 12/23/2019 12     BUN 12/23/2019 10     Creatinine 12/23/2019 0 59*    Glucose 12/23/2019 88     Glucose, Fasting 12/23/2019 88     Calcium 12/23/2019 9 6     AST 12/23/2019 120*    ALT 12/23/2019 277*    Alkaline Phosphatase 12/23/2019 77     Total Protein 12/23/2019 7 7     Albumin 12/23/2019 4 5     Total Bilirubin 12/23/2019 0 60     eGFR 12/23/2019 137        Discharge Medications:    See after visit summary for reconciled discharge medications provided to patient and family  Discharge instructions/Information to patient and family:     See after visit summary for information provided to patient and family  Provisions for Follow-Up Care:    See after visit summary for information related to follow-up care and any pertinent home health orders  Discharge Statement     I spent 30 minutes discharging the patient  This time was spent on the day of discharge  I had direct contact with the patient on the day of discharge  Additional documentation is required if more than 30 minutes were spent on discharge:    I reviewed with Trevor Smith importance of compliance with medications and outpatient treatment after discharge

## 2019-12-23 NOTE — PROGRESS NOTES
Progress Note Arun Delgadillo 1990, 34 y o  male MRN: [id number]    Unit/Bed#: [facility] 342-01 Encounter: [id number]    Primary Care Provider: No primary care provider on file  Date and time admitted to hospital: 12/18/2019  8:17 PM        Low TSH level  Assessment & Plan  Patient admitted and found to have low TSH level and slightly low free T3 level but normal free T4 level  Strongly encouraged him to avoid IV drug use as it may impair his thyroid function test   Ordered outpatient TSH and free T4 in 1 month and outpatient follow-up with PCP    Elevated liver enzymes  Assessment & Plan  Patient initially presented with acute transaminitis  It has improved little in follow-up CMP however still has elevated LFTs  His hepatitis C antibody is positive  Patient states that he does have history of IV drug use but does not share needles  I explained to him that it is high risk behavior and always risks for him to get blood borne infections transmitted and encouraged him to stop IV drug use and I am happy to see that he is going to [facility] program    Ordered hepatitis C RNA confirmatory test   Advised him to follow up outpatient with family doctor  Avoid hepatotoxic agents including Tylenol and IV drugs      VTE Pharmacologic Prophylaxis:   Pharmacologic: Pharmacologic VTE Prophylaxis contraindicated due to Low risk  Mechanical VTE Prophylaxis in Place: No    Patient Centered Rounds: I have performed bedside rounds with nursing staff today  Discussions with Specialists or Other Care Team Provider:  Discussed with psych    Education and Discussions with Family / Patient:  Discussed with patient at bedside    Time Spent for Care: 30 minutes  More than 50% of total time spent on counseling and coordination of care as described above      Current Length of Stay: 5 day(s)    Current Patient Status: Inpatient Psych   Certification Statement:     Discharge Plan:  Discharge home today    Code Status: Level 1 - Full Code      Subjective:   Patient denies any chest pain or shortness of breath or abdominal pain  Objective:     Vitals:   Temp (24hrs), Av 3 °F (36 8 °C), Min:98 °F (36 7 °C), Max:98 6 °F (37 °C)    Temp:  [98 °F (36 7 °C)-98 6 °F (37 °C)] 98 °F (36 7 °C)  HR:  [62-66] 66  Resp:  [16] 16  BP: (126-135)/(66-78) 135/78  Body mass index is 22 81 kg/m²  Input and Output Summary (last 24 hours):     No intake or output data in the 24 hours ending 19 1135    Physical Exam:     Physical Exam   Constitutional: He is oriented to person, place, and time  He appears well-developed and well-nourished  HENT:   Head: Normocephalic and atraumatic  Right Ear: External ear normal    Left Ear: External ear normal    Mouth/Throat: Oropharynx is clear and moist    Eyes: Conjunctivae and EOM are normal    Neck: Normal range of motion  Neck supple  Cardiovascular: Normal rate, regular rhythm and normal heart sounds  Pulmonary/Chest: Effort normal and breath sounds normal    Abdominal: Soft  Bowel sounds are normal  He exhibits no mass  There is no tenderness  There is no rebound and no guarding  Genitourinary:   Genitourinary Comments: deferred   Musculoskeletal: Normal range of motion  Neurological: He is alert and oriented to person, place, and time  He has normal reflexes  Cranial nerves 2-12 are normal   Normal neurological exam   Skin: Skin is warm and dry  No rash noted  Psychiatric: He has a normal mood and affect  Nursing note and vitals reviewed          Additional Data:     Labs:    Results from last 7 days   Lab Units 19  0611   WBC Thousand/uL 7 10   HEMOGLOBIN g/dL 14 5   HEMATOCRIT % 41 2   PLATELETS Thousands/uL 192   NEUTROS PCT % 55   LYMPHS PCT % 33   MONOS PCT % 10   EOS PCT % 2     Results from last 7 days   Lab Units 19  0557   SODIUM mmol/L 141   POTASSIUM mmol/L 4 2   CHLORIDE mmol/L 101   CO2 mmol/L 28   BUN mg/dL 10   CREATININE mg/dL 0 59*   ANION GAP mmol/L 12 CALCIUM mg/dL 9 6   ALBUMIN g/dL 4 5   TOTAL BILIRUBIN mg/dL 0 60   ALK PHOS U/L 77   ALT U/L 277*   AST U/L 120*   GLUCOSE RANDOM mg/dL 88             Results from last 7 days   Lab Units 12/20/19  0607   HEMOGLOBIN A1C % 4 7               * I Have Reviewed All Lab Data Listed Above  * Additional Pertinent Lab Tests Reviewed:  Saritha [address] Admission Reviewed    Imaging:    Imaging Reports Reviewed Today Include:  None  Imaging Personally Reviewed by Myself Includes:  None    Recent Cultures (last 7 days):           Last 24 Hours Medication List:     Current Facility-Administered Medications:  acetaminophen 650 mg Oral Q6H PRN Keila Oliveros MD   aluminum-magnesium hydroxide-simethicone 30 mL Oral Q4H PRN Teresa Poon MD   benztropine 1 mg Intramuscular Q6H PRN Keila Oliveros MD   benztropine 1 mg Oral Q6H PRN Keila Oliveros MD   cloNIDine 0 1 mg Oral Q4H PRN Teresa Poon MD   dicyclomine 20 mg Oral Q6H PRN Teresa Poon MD   gabapentin 300 mg Oral TID Keila Oliveros MD   haloperidol 5 mg Oral Q6H PRN Keila Oliveros MD   haloperidol lactate 5 mg Intramuscular Q6H PRN Keila Oliveros MD   hydrOXYzine HCL 25 mg Oral Q6H PRN Teresa Poon MD   hydrOXYzine HCL 50 mg Oral Q6H PRN Keila Oliveros MD   hydrOXYzine HCL 75 mg Oral Q6H PRN Keila Oliveros MD   ibuprofen 600 mg Oral Q8H PRN Teresa Poon MD   ibuprofen 800 mg Oral Q8H PRN Teresa Poon MD   loperamide 2 mg Oral Q4H PRN Keila Oliveros MD   LORazepam 1 mg Intramuscular Q6H PRN Keila Oliveros MD   magnesium hydroxide 30 mL Oral Daily PRN Teresa Poon MD   methocarbamol 500 mg Oral Q6H PRN Teresa Poon MD   multivitamin-minerals 1 tablet Oral Daily Keila Oliveros MD   nicotine 1 patch Transdermal Daily Amelia Lieberman MD   OLANZapine 10 mg Oral Q3H PRN Keila Oliveros MD   OLANZapine 10 mg Intramuscular Q3H PRN Keila Oliveros MD   risperiDONE 1 mg Oral Q6H PRN Teresa Poon MD   sertraline 50 mg Oral Daily Carolyne Evans MD   traZODone 50 mg Oral HS PRN Mahsa Alston MD        Today, Patient Was Seen By: Prateek Donnelly MD    ** Please Note: Dictation voice to text software may have been used in the creation of this document   **

## 2019-12-23 NOTE — CASE MANAGEMENT
Pt will be discharged today, taken by FARRAH directly to [facility] in [facility] for intake for funding for DA treatment and assessment  Uncle and Grandmother were notified  Pt and family in agreement with discharge  Uncle will meet Pt at [facility] and transport Pt home after appointment  PT willl return to his grandmother's home to live  Pt is looking forward to discharge before [place]

## 2019-12-23 NOTE — PROGRESS NOTES
Pt presents as visible in public areas as well as bedroom  Currently denies SI, HI and A/V hallucinations  Compliant with meds  Observed as calm, quiet and cooperative  Appears brighter while in milieu  Acknowledges plan to go to drug rehab, vocalizes agreement with plan  Reports withdrawal symptoms feeling less intense

## 2019-12-23 NOTE — PROGRESS NOTES
Pt is calm, cooperative, compliant with medications  He is excited for discharge, states he feels ready to leave  Pt denies all S/S

## 2019-12-23 NOTE — ASSESSMENT & PLAN NOTE
Patient initially presented with acute transaminitis  It has improved little in follow-up CMP however still has elevated LFTs  His hepatitis C antibody is positive  Patient states that he does have history of IV drug use but does not share needles    I explained to him that it is high risk behavior and always risks for him to get blood borne infections transmitted and encouraged him to stop IV drug use and I am happy to see that he is going to [facility]    Ordered hepatitis C RNA confirmatory test   Advised him to follow up outpatient with family doctor  Avoid hepatotoxic agents including Tylenol and IV drugs

## 2019-12-23 NOTE — PROGRESS NOTES
12/23/19 0700   Team Meeting   Meeting Type Daily Rounds   Initial Conference Date 12/23/19   Team Members Present   Team Members Present Physician;Nurse;;; Other (Discipline and Name)   Physician Team Member [facility] Team Member [place] Work Team Member Yunier Leone   Other (Discipline and Name) NP- Maral Moore; Medical student- Johan Mode   Patient/Family Present   Patient Present No   Patient's Family Present No     Need to be seen by medical prior to d/c  Hep C was positive and this is a new result

## (undated) DEVICE — SPONGE STICK WITH PVP-I: Brand: KENDALL

## (undated) DEVICE — PAD GROUNDING ADULT

## (undated) DEVICE — BETHLEHEM UNIVERSAL MINOR GEN: Brand: CARDINAL HEALTH

## (undated) DEVICE — LIGHT HANDLE COVER SLEEVE DISP BLUE STELLAR

## (undated) DEVICE — DRAPE EQUIPMENT RF WAND

## (undated) DEVICE — GLOVE INDICATOR PI UNDERGLOVE SZ 7.5 BLUE

## (undated) DEVICE — POOLE SUCTION HANDLE: Brand: CARDINAL HEALTH

## (undated) DEVICE — SCD SEQUENTIAL COMPRESSION COMFORT SLEEVE MEDIUM KNEE LENGTH: Brand: KENDALL SCD

## (undated) DEVICE — CURITY PLAIN PACKING STRIP: Brand: CURITY

## (undated) DEVICE — NEEDLE 25G X 1 1/2

## (undated) DEVICE — TUBING SUCTION 5MM X 12 FT

## (undated) DEVICE — CHLORAPREP HI-LITE 26ML ORANGE

## (undated) DEVICE — GLOVE SRG BIOGEL ECLIPSE 7.5

## (undated) DEVICE — INTENDED FOR TISSUE SEPARATION, AND OTHER PROCEDURES THAT REQUIRE A SHARP SURGICAL BLADE TO PUNCTURE OR CUT.: Brand: BARD-PARKER SAFETY BLADES SIZE 15, STERILE